# Patient Record
Sex: FEMALE | URBAN - METROPOLITAN AREA
[De-identification: names, ages, dates, MRNs, and addresses within clinical notes are randomized per-mention and may not be internally consistent; named-entity substitution may affect disease eponyms.]

---

## 2023-05-06 ENCOUNTER — HOSPITAL ENCOUNTER (OUTPATIENT)
Dept: DATA CONVERSION | Facility: HOSPITAL | Age: 78
End: 2023-05-06

## 2024-04-04 ENCOUNTER — LAB REQUISITION (OUTPATIENT)
Dept: LAB | Facility: HOSPITAL | Age: 79
End: 2024-04-04
Payer: MEDICAID

## 2024-04-04 DIAGNOSIS — R11.10 VOMITING, UNSPECIFIED: ICD-10-CM

## 2024-04-04 LAB
ALBUMIN SERPL BCP-MCNC: 3.7 G/DL (ref 3.4–5)
ALP SERPL-CCNC: 79 U/L (ref 33–136)
ALT SERPL W P-5'-P-CCNC: 5 U/L (ref 7–45)
ANION GAP SERPL CALC-SCNC: 13 MMOL/L (ref 10–20)
AST SERPL W P-5'-P-CCNC: 10 U/L (ref 9–39)
BASOPHILS # BLD AUTO: 0.03 X10*3/UL (ref 0–0.1)
BASOPHILS NFR BLD AUTO: 0.3 %
BILIRUB SERPL-MCNC: 0.5 MG/DL (ref 0–1.2)
BUN SERPL-MCNC: 33 MG/DL (ref 6–23)
CALCIUM SERPL-MCNC: 9.5 MG/DL (ref 8.6–10.3)
CHLORIDE SERPL-SCNC: 104 MMOL/L (ref 98–107)
CO2 SERPL-SCNC: 30 MMOL/L (ref 21–32)
CREAT SERPL-MCNC: 1.37 MG/DL (ref 0.5–1.05)
EGFRCR SERPLBLD CKD-EPI 2021: 39 ML/MIN/1.73M*2
EOSINOPHIL # BLD AUTO: 0.08 X10*3/UL (ref 0–0.4)
EOSINOPHIL NFR BLD AUTO: 0.9 %
ERYTHROCYTE [DISTWIDTH] IN BLOOD BY AUTOMATED COUNT: 16.2 % (ref 11.5–14.5)
GLUCOSE SERPL-MCNC: 93 MG/DL (ref 74–99)
HCT VFR BLD AUTO: 38.1 % (ref 36–46)
HGB BLD-MCNC: 11.6 G/DL (ref 12–16)
IMM GRANULOCYTES # BLD AUTO: 0.03 X10*3/UL (ref 0–0.5)
IMM GRANULOCYTES NFR BLD AUTO: 0.3 % (ref 0–0.9)
LYMPHOCYTES # BLD AUTO: 1.43 X10*3/UL (ref 0.8–3)
LYMPHOCYTES NFR BLD AUTO: 15.4 %
MCH RBC QN AUTO: 27.4 PG (ref 26–34)
MCHC RBC AUTO-ENTMCNC: 30.4 G/DL (ref 32–36)
MCV RBC AUTO: 90 FL (ref 80–100)
MONOCYTES # BLD AUTO: 0.82 X10*3/UL (ref 0.05–0.8)
MONOCYTES NFR BLD AUTO: 8.8 %
NEUTROPHILS # BLD AUTO: 6.9 X10*3/UL (ref 1.6–5.5)
NEUTROPHILS NFR BLD AUTO: 74.3 %
NRBC BLD-RTO: 0 /100 WBCS (ref 0–0)
PLATELET # BLD AUTO: 241 X10*3/UL (ref 150–450)
POTASSIUM SERPL-SCNC: 4.3 MMOL/L (ref 3.5–5.3)
PROT SERPL-MCNC: 6.4 G/DL (ref 6.4–8.2)
RBC # BLD AUTO: 4.23 X10*6/UL (ref 4–5.2)
SODIUM SERPL-SCNC: 143 MMOL/L (ref 136–145)
WBC # BLD AUTO: 9.3 X10*3/UL (ref 4.4–11.3)

## 2024-04-04 PROCEDURE — 80053 COMPREHEN METABOLIC PANEL: CPT

## 2024-04-04 PROCEDURE — 85025 COMPLETE CBC W/AUTO DIFF WBC: CPT

## 2025-06-15 ENCOUNTER — APPOINTMENT (OUTPATIENT)
Dept: CARDIOLOGY | Facility: HOSPITAL | Age: 80
End: 2025-06-15
Payer: MEDICAID

## 2025-06-15 ENCOUNTER — HOSPITAL ENCOUNTER (INPATIENT)
Facility: HOSPITAL | Age: 80
LOS: 6 days | Discharge: HOME HEALTH | End: 2025-06-21
Attending: STUDENT IN AN ORGANIZED HEALTH CARE EDUCATION/TRAINING PROGRAM | Admitting: INTERNAL MEDICINE
Payer: MEDICAID

## 2025-06-15 ENCOUNTER — APPOINTMENT (OUTPATIENT)
Dept: RADIOLOGY | Facility: HOSPITAL | Age: 80
End: 2025-06-15
Payer: MEDICAID

## 2025-06-15 DIAGNOSIS — M15.0 PRIMARY OSTEOARTHRITIS INVOLVING MULTIPLE JOINTS: ICD-10-CM

## 2025-06-15 DIAGNOSIS — L03.115 CELLULITIS OF RIGHT LOWER EXTREMITY: Primary | ICD-10-CM

## 2025-06-15 DIAGNOSIS — S80.11XA HEMATOMA OF RIGHT LOWER EXTREMITY, INITIAL ENCOUNTER: ICD-10-CM

## 2025-06-15 DIAGNOSIS — I48.92 ATRIAL FLUTTER, UNSPECIFIED TYPE (MULTI): ICD-10-CM

## 2025-06-15 DIAGNOSIS — R41.0 DELIRIUM: ICD-10-CM

## 2025-06-15 LAB
ALBUMIN SERPL BCP-MCNC: 3.3 G/DL (ref 3.4–5)
ALP SERPL-CCNC: 79 U/L (ref 33–136)
ALT SERPL W P-5'-P-CCNC: 9 U/L (ref 7–45)
ANION GAP SERPL CALCULATED.3IONS-SCNC: 13 MMOL/L (ref 10–20)
APPEARANCE UR: ABNORMAL
AST SERPL W P-5'-P-CCNC: 13 U/L (ref 9–39)
BACTERIA #/AREA URNS AUTO: ABNORMAL /HPF
BASOPHILS # BLD AUTO: 0.05 X10*3/UL (ref 0–0.1)
BASOPHILS NFR BLD AUTO: 0.4 %
BILIRUB SERPL-MCNC: 0.5 MG/DL (ref 0–1.2)
BILIRUB UR STRIP.AUTO-MCNC: NEGATIVE MG/DL
BUN SERPL-MCNC: 42 MG/DL (ref 6–23)
CALCIUM SERPL-MCNC: 9.1 MG/DL (ref 8.6–10.3)
CARDIAC TROPONIN I PNL SERPL HS: 13 NG/L (ref 0–13)
CHLORIDE SERPL-SCNC: 105 MMOL/L (ref 98–107)
CO2 SERPL-SCNC: 25 MMOL/L (ref 21–32)
COLOR UR: YELLOW
CREAT SERPL-MCNC: 0.9 MG/DL (ref 0.5–1.05)
EGFRCR SERPLBLD CKD-EPI 2021: 64 ML/MIN/1.73M*2
EOSINOPHIL # BLD AUTO: 0.11 X10*3/UL (ref 0–0.4)
EOSINOPHIL NFR BLD AUTO: 0.9 %
ERYTHROCYTE [DISTWIDTH] IN BLOOD BY AUTOMATED COUNT: 14.5 % (ref 11.5–14.5)
GLUCOSE SERPL-MCNC: 106 MG/DL (ref 74–99)
GLUCOSE UR STRIP.AUTO-MCNC: NORMAL MG/DL
HCT VFR BLD AUTO: 35.6 % (ref 36–46)
HGB BLD-MCNC: 11.2 G/DL (ref 12–16)
HOLD SPECIMEN: NORMAL
IMM GRANULOCYTES # BLD AUTO: 0.07 X10*3/UL (ref 0–0.5)
IMM GRANULOCYTES NFR BLD AUTO: 0.6 % (ref 0–0.9)
KETONES UR STRIP.AUTO-MCNC: NEGATIVE MG/DL
LACTATE SERPL-SCNC: 0.6 MMOL/L (ref 0.4–2)
LACTATE SERPL-SCNC: 0.8 MMOL/L (ref 0.4–2)
LEUKOCYTE ESTERASE UR QL STRIP.AUTO: ABNORMAL
LYMPHOCYTES # BLD AUTO: 1.13 X10*3/UL (ref 0.8–3)
LYMPHOCYTES NFR BLD AUTO: 9.6 %
MCH RBC QN AUTO: 28.4 PG (ref 26–34)
MCHC RBC AUTO-ENTMCNC: 31.5 G/DL (ref 32–36)
MCV RBC AUTO: 90 FL (ref 80–100)
MONOCYTES # BLD AUTO: 0.81 X10*3/UL (ref 0.05–0.8)
MONOCYTES NFR BLD AUTO: 6.9 %
NEUTROPHILS # BLD AUTO: 9.55 X10*3/UL (ref 1.6–5.5)
NEUTROPHILS NFR BLD AUTO: 81.6 %
NITRITE UR QL STRIP.AUTO: NEGATIVE
NRBC BLD-RTO: 0 /100 WBCS (ref 0–0)
PH UR STRIP.AUTO: 5.5 [PH]
PLATELET # BLD AUTO: 300 X10*3/UL (ref 150–450)
POTASSIUM SERPL-SCNC: 4.5 MMOL/L (ref 3.5–5.3)
PROT SERPL-MCNC: 6.7 G/DL (ref 6.4–8.2)
PROT UR STRIP.AUTO-MCNC: NEGATIVE MG/DL
RBC # BLD AUTO: 3.94 X10*6/UL (ref 4–5.2)
RBC # UR STRIP.AUTO: ABNORMAL MG/DL
RBC #/AREA URNS AUTO: ABNORMAL /HPF
SODIUM SERPL-SCNC: 138 MMOL/L (ref 136–145)
SP GR UR STRIP.AUTO: 1.02
SQUAMOUS #/AREA URNS AUTO: ABNORMAL /HPF
UROBILINOGEN UR STRIP.AUTO-MCNC: NORMAL MG/DL
WBC # BLD AUTO: 11.7 X10*3/UL (ref 4.4–11.3)
WBC #/AREA URNS AUTO: ABNORMAL /HPF

## 2025-06-15 PROCEDURE — 2500000001 HC RX 250 WO HCPCS SELF ADMINISTERED DRUGS (ALT 637 FOR MEDICARE OP): Performed by: INTERNAL MEDICINE

## 2025-06-15 PROCEDURE — 99285 EMERGENCY DEPT VISIT HI MDM: CPT | Mod: 25 | Performed by: STUDENT IN AN ORGANIZED HEALTH CARE EDUCATION/TRAINING PROGRAM

## 2025-06-15 PROCEDURE — 2500000005 HC RX 250 GENERAL PHARMACY W/O HCPCS: Performed by: INTERNAL MEDICINE

## 2025-06-15 PROCEDURE — 36415 COLL VENOUS BLD VENIPUNCTURE: CPT | Performed by: STUDENT IN AN ORGANIZED HEALTH CARE EDUCATION/TRAINING PROGRAM

## 2025-06-15 PROCEDURE — 84484 ASSAY OF TROPONIN QUANT: CPT | Performed by: STUDENT IN AN ORGANIZED HEALTH CARE EDUCATION/TRAINING PROGRAM

## 2025-06-15 PROCEDURE — 96365 THER/PROPH/DIAG IV INF INIT: CPT

## 2025-06-15 PROCEDURE — 81001 URINALYSIS AUTO W/SCOPE: CPT | Performed by: STUDENT IN AN ORGANIZED HEALTH CARE EDUCATION/TRAINING PROGRAM

## 2025-06-15 PROCEDURE — 73700 CT LOWER EXTREMITY W/O DYE: CPT | Mod: RT

## 2025-06-15 PROCEDURE — 1200000002 HC GENERAL ROOM WITH TELEMETRY DAILY

## 2025-06-15 PROCEDURE — 2500000002 HC RX 250 W HCPCS SELF ADMINISTERED DRUGS (ALT 637 FOR MEDICARE OP, ALT 636 FOR OP/ED): Performed by: INTERNAL MEDICINE

## 2025-06-15 PROCEDURE — 80053 COMPREHEN METABOLIC PANEL: CPT | Performed by: STUDENT IN AN ORGANIZED HEALTH CARE EDUCATION/TRAINING PROGRAM

## 2025-06-15 PROCEDURE — 87086 URINE CULTURE/COLONY COUNT: CPT | Mod: WESLAB | Performed by: STUDENT IN AN ORGANIZED HEALTH CARE EDUCATION/TRAINING PROGRAM

## 2025-06-15 PROCEDURE — 2500000004 HC RX 250 GENERAL PHARMACY W/ HCPCS (ALT 636 FOR OP/ED): Performed by: INTERNAL MEDICINE

## 2025-06-15 PROCEDURE — 2500000004 HC RX 250 GENERAL PHARMACY W/ HCPCS (ALT 636 FOR OP/ED): Performed by: PHARMACY

## 2025-06-15 PROCEDURE — 83605 ASSAY OF LACTIC ACID: CPT | Performed by: STUDENT IN AN ORGANIZED HEALTH CARE EDUCATION/TRAINING PROGRAM

## 2025-06-15 PROCEDURE — 96361 HYDRATE IV INFUSION ADD-ON: CPT

## 2025-06-15 PROCEDURE — 96366 THER/PROPH/DIAG IV INF ADDON: CPT

## 2025-06-15 PROCEDURE — 2500000004 HC RX 250 GENERAL PHARMACY W/ HCPCS (ALT 636 FOR OP/ED): Performed by: STUDENT IN AN ORGANIZED HEALTH CARE EDUCATION/TRAINING PROGRAM

## 2025-06-15 PROCEDURE — 93010 ELECTROCARDIOGRAM REPORT: CPT | Performed by: INTERNAL MEDICINE

## 2025-06-15 PROCEDURE — 87075 CULTR BACTERIA EXCEPT BLOOD: CPT | Mod: WESLAB | Performed by: STUDENT IN AN ORGANIZED HEALTH CARE EDUCATION/TRAINING PROGRAM

## 2025-06-15 PROCEDURE — 73700 CT LOWER EXTREMITY W/O DYE: CPT | Mod: RIGHT SIDE | Performed by: RADIOLOGY

## 2025-06-15 PROCEDURE — 85025 COMPLETE CBC W/AUTO DIFF WBC: CPT | Performed by: STUDENT IN AN ORGANIZED HEALTH CARE EDUCATION/TRAINING PROGRAM

## 2025-06-15 PROCEDURE — 93005 ELECTROCARDIOGRAM TRACING: CPT

## 2025-06-15 RX ORDER — OXYBUTYNIN CHLORIDE 5 MG/1
2.5 TABLET ORAL 2 TIMES DAILY
Status: DISCONTINUED | OUTPATIENT
Start: 2025-06-15 | End: 2025-06-21 | Stop reason: HOSPADM

## 2025-06-15 RX ORDER — VANCOMYCIN 750 MG/150ML
750 INJECTION, SOLUTION INTRAVENOUS EVERY 12 HOURS
Status: DISCONTINUED | OUTPATIENT
Start: 2025-06-15 | End: 2025-06-16

## 2025-06-15 RX ORDER — SENNOSIDES 8.6 MG/1
1 TABLET ORAL 2 TIMES DAILY
COMMUNITY

## 2025-06-15 RX ORDER — GUAIFENESIN 600 MG/1
600 TABLET, EXTENDED RELEASE ORAL EVERY 12 HOURS PRN
Status: DISCONTINUED | OUTPATIENT
Start: 2025-06-15 | End: 2025-06-21 | Stop reason: HOSPADM

## 2025-06-15 RX ORDER — GUAIFENESIN/DEXTROMETHORPHAN 100-10MG/5
5 SYRUP ORAL EVERY 4 HOURS PRN
Status: DISCONTINUED | OUTPATIENT
Start: 2025-06-15 | End: 2025-06-21 | Stop reason: HOSPADM

## 2025-06-15 RX ORDER — PANTOPRAZOLE SODIUM 40 MG/1
40 TABLET, DELAYED RELEASE ORAL
Status: DISCONTINUED | OUTPATIENT
Start: 2025-06-16 | End: 2025-06-21 | Stop reason: HOSPADM

## 2025-06-15 RX ORDER — LIDOCAINE 560 MG/1
1 PATCH PERCUTANEOUS; TOPICAL; TRANSDERMAL DAILY
Status: DISCONTINUED | OUTPATIENT
Start: 2025-06-16 | End: 2025-06-21 | Stop reason: HOSPADM

## 2025-06-15 RX ORDER — POLYETHYLENE GLYCOL 3350 17 G/17G
17 POWDER, FOR SOLUTION ORAL DAILY
Status: DISCONTINUED | OUTPATIENT
Start: 2025-06-16 | End: 2025-06-17

## 2025-06-15 RX ORDER — DONEPEZIL HYDROCHLORIDE 5 MG/1
5 TABLET, FILM COATED ORAL NIGHTLY
COMMUNITY

## 2025-06-15 RX ORDER — OXYBUTYNIN CHLORIDE 5 MG/1
5 TABLET, EXTENDED RELEASE ORAL DAILY
COMMUNITY

## 2025-06-15 RX ORDER — LEVOTHYROXINE SODIUM 150 UG/1
150 TABLET ORAL DAILY
COMMUNITY

## 2025-06-15 RX ORDER — LEVOTHYROXINE SODIUM 150 UG/1
150 TABLET ORAL DAILY
Status: DISCONTINUED | OUTPATIENT
Start: 2025-06-16 | End: 2025-06-21 | Stop reason: HOSPADM

## 2025-06-15 RX ORDER — FERROUS SULFATE 325(65) MG
1 TABLET ORAL
Status: DISCONTINUED | OUTPATIENT
Start: 2025-06-16 | End: 2025-06-21 | Stop reason: HOSPADM

## 2025-06-15 RX ORDER — ACETAMINOPHEN 500 MG
1000 TABLET ORAL 3 TIMES DAILY
COMMUNITY

## 2025-06-15 RX ORDER — ACETAMINOPHEN 650 MG/1
650 SUPPOSITORY RECTAL EVERY 4 HOURS PRN
Status: DISCONTINUED | OUTPATIENT
Start: 2025-06-15 | End: 2025-06-21 | Stop reason: HOSPADM

## 2025-06-15 RX ORDER — POLYETHYLENE GLYCOL 3350 17 G/17G
17 POWDER, FOR SOLUTION ORAL DAILY
COMMUNITY

## 2025-06-15 RX ORDER — FERROUS SULFATE 325(65) MG
325 TABLET ORAL
COMMUNITY

## 2025-06-15 RX ORDER — HYDROCODONE BITARTRATE AND ACETAMINOPHEN 5; 325 MG/1; MG/1
1 TABLET ORAL EVERY 6 HOURS PRN
Refills: 0 | Status: DISCONTINUED | OUTPATIENT
Start: 2025-06-15 | End: 2025-06-21 | Stop reason: HOSPADM

## 2025-06-15 RX ORDER — ONDANSETRON 4 MG/1
4 TABLET, FILM COATED ORAL EVERY 8 HOURS PRN
Status: DISCONTINUED | OUTPATIENT
Start: 2025-06-15 | End: 2025-06-21 | Stop reason: HOSPADM

## 2025-06-15 RX ORDER — HYDROXYZINE PAMOATE 25 MG/1
25 CAPSULE ORAL EVERY 6 HOURS PRN
COMMUNITY

## 2025-06-15 RX ORDER — LIDOCAINE 50 MG/G
1 PATCH TOPICAL NIGHTLY
COMMUNITY

## 2025-06-15 RX ORDER — ACETAMINOPHEN 325 MG/1
650 TABLET ORAL EVERY 4 HOURS PRN
Status: DISCONTINUED | OUTPATIENT
Start: 2025-06-15 | End: 2025-06-21 | Stop reason: HOSPADM

## 2025-06-15 RX ORDER — VANCOMYCIN HYDROCHLORIDE 1 G/20ML
INJECTION, POWDER, LYOPHILIZED, FOR SOLUTION INTRAVENOUS DAILY PRN
Status: DISCONTINUED | OUTPATIENT
Start: 2025-06-15 | End: 2025-06-20

## 2025-06-15 RX ORDER — ONDANSETRON HYDROCHLORIDE 2 MG/ML
4 INJECTION, SOLUTION INTRAVENOUS EVERY 8 HOURS PRN
Status: DISCONTINUED | OUTPATIENT
Start: 2025-06-15 | End: 2025-06-21 | Stop reason: HOSPADM

## 2025-06-15 RX ORDER — ACETAMINOPHEN 160 MG/5ML
650 SOLUTION ORAL EVERY 4 HOURS PRN
Status: DISCONTINUED | OUTPATIENT
Start: 2025-06-15 | End: 2025-06-21 | Stop reason: HOSPADM

## 2025-06-15 RX ORDER — DONEPEZIL HYDROCHLORIDE 5 MG/1
5 TABLET, FILM COATED ORAL NIGHTLY
Status: DISCONTINUED | OUTPATIENT
Start: 2025-06-15 | End: 2025-06-21 | Stop reason: HOSPADM

## 2025-06-15 RX ORDER — POLYETHYLENE GLYCOL 3350 17 G/17G
17 POWDER, FOR SOLUTION ORAL DAILY PRN
Status: DISCONTINUED | OUTPATIENT
Start: 2025-06-15 | End: 2025-06-21 | Stop reason: HOSPADM

## 2025-06-15 RX ORDER — TALC
6 POWDER (GRAM) TOPICAL NIGHTLY PRN
Status: DISCONTINUED | OUTPATIENT
Start: 2025-06-15 | End: 2025-06-21 | Stop reason: HOSPADM

## 2025-06-15 RX ORDER — HEPARIN SODIUM 5000 [USP'U]/ML
5000 INJECTION, SOLUTION INTRAVENOUS; SUBCUTANEOUS EVERY 8 HOURS
Status: DISCONTINUED | OUTPATIENT
Start: 2025-06-15 | End: 2025-06-21 | Stop reason: HOSPADM

## 2025-06-15 RX ORDER — OMEPRAZOLE 20 MG/1
20 TABLET, DELAYED RELEASE ORAL
COMMUNITY

## 2025-06-15 RX ORDER — VANCOMYCIN 2 G/400ML
2 INJECTION, SOLUTION INTRAVENOUS ONCE
Status: COMPLETED | OUTPATIENT
Start: 2025-06-15 | End: 2025-06-15

## 2025-06-15 RX ORDER — HYDROXYZINE HYDROCHLORIDE 25 MG/1
25 TABLET, FILM COATED ORAL EVERY 8 HOURS PRN
Status: DISCONTINUED | OUTPATIENT
Start: 2025-06-15 | End: 2025-06-21 | Stop reason: HOSPADM

## 2025-06-15 RX ADMIN — SODIUM CHLORIDE 1710 ML: 900 INJECTION, SOLUTION INTRAVENOUS at 07:43

## 2025-06-15 RX ADMIN — HEPARIN SODIUM 5000 UNITS: 5000 INJECTION, SOLUTION INTRAVENOUS; SUBCUTANEOUS at 18:32

## 2025-06-15 RX ADMIN — VANCOMYCIN 2 G: 2 INJECTION, SOLUTION INTRAVENOUS at 08:20

## 2025-06-15 RX ADMIN — PIPERACILLIN SODIUM AND TAZOBACTAM SODIUM 4.5 G: 4; .5 INJECTION, SOLUTION INTRAVENOUS at 07:43

## 2025-06-15 RX ADMIN — OXYBUTYNIN CHLORIDE 2.5 MG: 5 TABLET ORAL at 22:58

## 2025-06-15 RX ADMIN — ACETAMINOPHEN 650 MG: 325 TABLET ORAL at 22:59

## 2025-06-15 RX ADMIN — PIPERACILLIN SODIUM AND TAZOBACTAM SODIUM 4.5 G: 4; .5 INJECTION, SOLUTION INTRAVENOUS at 23:28

## 2025-06-15 RX ADMIN — DONEPEZIL HYDROCHLORIDE 5 MG: 5 TABLET, FILM COATED ORAL at 22:59

## 2025-06-15 RX ADMIN — MELATONIN 6 MG: 3 TAB ORAL at 22:59

## 2025-06-15 RX ADMIN — VANCOMYCIN 750 MG: 750 INJECTION, SOLUTION INTRAVENOUS at 21:42

## 2025-06-15 RX ADMIN — PIPERACILLIN SODIUM AND TAZOBACTAM SODIUM 4.5 G: 4; .5 INJECTION, SOLUTION INTRAVENOUS at 18:32

## 2025-06-15 SDOH — SOCIAL STABILITY: SOCIAL INSECURITY
WITHIN THE LAST YEAR, HAVE YOU BEEN KICKED, HIT, SLAPPED, OR OTHERWISE PHYSICALLY HURT BY YOUR PARTNER OR EX-PARTNER?: NO

## 2025-06-15 SDOH — ECONOMIC STABILITY: FOOD INSECURITY: WITHIN THE PAST 12 MONTHS, THE FOOD YOU BOUGHT JUST DIDN'T LAST AND YOU DIDN'T HAVE MONEY TO GET MORE.: NEVER TRUE

## 2025-06-15 SDOH — SOCIAL STABILITY: SOCIAL INSECURITY: DO YOU FEEL ANYONE HAS EXPLOITED OR TAKEN ADVANTAGE OF YOU FINANCIALLY OR OF YOUR PERSONAL PROPERTY?: NO

## 2025-06-15 SDOH — SOCIAL STABILITY: SOCIAL INSECURITY: ARE THERE ANY APPARENT SIGNS OF INJURIES/BEHAVIORS THAT COULD BE RELATED TO ABUSE/NEGLECT?: NO

## 2025-06-15 SDOH — ECONOMIC STABILITY: INCOME INSECURITY: IN THE PAST 12 MONTHS HAS THE ELECTRIC, GAS, OIL, OR WATER COMPANY THREATENED TO SHUT OFF SERVICES IN YOUR HOME?: NO

## 2025-06-15 SDOH — ECONOMIC STABILITY: TRANSPORTATION INSECURITY: IN THE PAST 12 MONTHS, HAS LACK OF TRANSPORTATION KEPT YOU FROM MEDICAL APPOINTMENTS OR FROM GETTING MEDICATIONS?: NO

## 2025-06-15 SDOH — ECONOMIC STABILITY: HOUSING INSECURITY: IN THE LAST 12 MONTHS, WAS THERE A TIME WHEN YOU WERE NOT ABLE TO PAY THE MORTGAGE OR RENT ON TIME?: NO

## 2025-06-15 SDOH — SOCIAL STABILITY: SOCIAL INSECURITY
WITHIN THE LAST YEAR, HAVE YOU BEEN RAPED OR FORCED TO HAVE ANY KIND OF SEXUAL ACTIVITY BY YOUR PARTNER OR EX-PARTNER?: NO

## 2025-06-15 SDOH — SOCIAL STABILITY: SOCIAL INSECURITY: ABUSE: ADULT

## 2025-06-15 SDOH — ECONOMIC STABILITY: FOOD INSECURITY: WITHIN THE PAST 12 MONTHS, YOU WORRIED THAT YOUR FOOD WOULD RUN OUT BEFORE YOU GOT THE MONEY TO BUY MORE.: NEVER TRUE

## 2025-06-15 SDOH — SOCIAL STABILITY: SOCIAL INSECURITY: WITHIN THE LAST YEAR, HAVE YOU BEEN AFRAID OF YOUR PARTNER OR EX-PARTNER?: NO

## 2025-06-15 SDOH — SOCIAL STABILITY: SOCIAL INSECURITY: HAVE YOU HAD THOUGHTS OF HARMING ANYONE ELSE?: NO

## 2025-06-15 SDOH — SOCIAL STABILITY: SOCIAL INSECURITY: WERE YOU ABLE TO COMPLETE ALL THE BEHAVIORAL HEALTH SCREENINGS?: YES

## 2025-06-15 SDOH — SOCIAL STABILITY: SOCIAL INSECURITY: WITHIN THE LAST YEAR, HAVE YOU BEEN HUMILIATED OR EMOTIONALLY ABUSED IN OTHER WAYS BY YOUR PARTNER OR EX-PARTNER?: NO

## 2025-06-15 SDOH — ECONOMIC STABILITY: FOOD INSECURITY: HOW HARD IS IT FOR YOU TO PAY FOR THE VERY BASICS LIKE FOOD, HOUSING, MEDICAL CARE, AND HEATING?: NOT HARD AT ALL

## 2025-06-15 SDOH — ECONOMIC STABILITY: HOUSING INSECURITY: AT ANY TIME IN THE PAST 12 MONTHS, WERE YOU HOMELESS OR LIVING IN A SHELTER (INCLUDING NOW)?: NO

## 2025-06-15 SDOH — SOCIAL STABILITY: SOCIAL INSECURITY: HAVE YOU HAD ANY THOUGHTS OF HARMING ANYONE ELSE?: NO

## 2025-06-15 SDOH — ECONOMIC STABILITY: HOUSING INSECURITY: IN THE PAST 12 MONTHS, HOW MANY TIMES HAVE YOU MOVED WHERE YOU WERE LIVING?: 0

## 2025-06-15 SDOH — SOCIAL STABILITY: SOCIAL INSECURITY: DOES ANYONE TRY TO KEEP YOU FROM HAVING/CONTACTING OTHER FRIENDS OR DOING THINGS OUTSIDE YOUR HOME?: NO

## 2025-06-15 SDOH — SOCIAL STABILITY: SOCIAL INSECURITY: DO YOU FEEL UNSAFE GOING BACK TO THE PLACE WHERE YOU ARE LIVING?: NO

## 2025-06-15 SDOH — SOCIAL STABILITY: SOCIAL INSECURITY: HAS ANYONE EVER THREATENED TO HURT YOUR FAMILY OR YOUR PETS?: NO

## 2025-06-15 SDOH — SOCIAL STABILITY: SOCIAL INSECURITY: ARE YOU OR HAVE YOU BEEN THREATENED OR ABUSED PHYSICALLY, EMOTIONALLY, OR SEXUALLY BY ANYONE?: NO

## 2025-06-15 ASSESSMENT — COGNITIVE AND FUNCTIONAL STATUS - GENERAL
WALKING IN HOSPITAL ROOM: TOTAL
EATING MEALS: A LITTLE
PATIENT BASELINE BEDBOUND: NO
MOBILITY SCORE: 14
PERSONAL GROOMING: A LOT
HELP NEEDED FOR BATHING: A LOT
MOVING FROM LYING ON BACK TO SITTING ON SIDE OF FLAT BED WITH BEDRAILS: A LITTLE
STANDING UP FROM CHAIR USING ARMS: A LOT
DAILY ACTIVITIY SCORE: 13
MOVING TO AND FROM BED TO CHAIR: A LOT
WALKING IN HOSPITAL ROOM: A LOT
EATING MEALS: A LITTLE
CLIMB 3 TO 5 STEPS WITH RAILING: A LITTLE
CLIMB 3 TO 5 STEPS WITH RAILING: TOTAL
DRESSING REGULAR UPPER BODY CLOTHING: A LITTLE
TURNING FROM BACK TO SIDE WHILE IN FLAT BAD: A LOT
MOBILITY SCORE: 11
HELP NEEDED FOR BATHING: A LOT
DRESSING REGULAR UPPER BODY CLOTHING: A LOT
TOILETING: A LOT
DAILY ACTIVITIY SCORE: 15
PERSONAL GROOMING: A LITTLE
TOILETING: A LOT
DRESSING REGULAR LOWER BODY CLOTHING: A LOT
STANDING UP FROM CHAIR USING ARMS: A LOT
DRESSING REGULAR LOWER BODY CLOTHING: A LOT
MOVING TO AND FROM BED TO CHAIR: A LOT
TURNING FROM BACK TO SIDE WHILE IN FLAT BAD: A LOT
MOVING FROM LYING ON BACK TO SITTING ON SIDE OF FLAT BED WITH BEDRAILS: A LITTLE

## 2025-06-15 ASSESSMENT — LIFESTYLE VARIABLES
TOTAL SCORE: 0
HOW OFTEN DO YOU HAVE A DRINK CONTAINING ALCOHOL: NEVER
EVER FELT BAD OR GUILTY ABOUT YOUR DRINKING: NO
AUDIT-C TOTAL SCORE: 0
EVER HAD A DRINK FIRST THING IN THE MORNING TO STEADY YOUR NERVES TO GET RID OF A HANGOVER: NO
HAVE YOU EVER FELT YOU SHOULD CUT DOWN ON YOUR DRINKING: NO
HAVE PEOPLE ANNOYED YOU BY CRITICIZING YOUR DRINKING: NO
SKIP TO QUESTIONS 9-10: 1
HOW MANY STANDARD DRINKS CONTAINING ALCOHOL DO YOU HAVE ON A TYPICAL DAY: PATIENT DOES NOT DRINK
HOW OFTEN DO YOU HAVE 6 OR MORE DRINKS ON ONE OCCASION: NEVER
AUDIT-C TOTAL SCORE: 0

## 2025-06-15 ASSESSMENT — ACTIVITIES OF DAILY LIVING (ADL)
LACK_OF_TRANSPORTATION: NO
ASSISTIVE_DEVICE: WALKER
JUDGMENT_ADEQUATE_SAFELY_COMPLETE_DAILY_ACTIVITIES: YES
PATIENT'S MEMORY ADEQUATE TO SAFELY COMPLETE DAILY ACTIVITIES?: YES
TOILETING: NEEDS ASSISTANCE
FEEDING YOURSELF: INDEPENDENT
HEARING - RIGHT EAR: FUNCTIONAL
GROOMING: NEEDS ASSISTANCE
DRESSING YOURSELF: NEEDS ASSISTANCE
LACK_OF_TRANSPORTATION: NO
HEARING - LEFT EAR: FUNCTIONAL
ADEQUATE_TO_COMPLETE_ADL: YES
WALKS IN HOME: NEEDS ASSISTANCE
BATHING: NEEDS ASSISTANCE

## 2025-06-15 ASSESSMENT — PAIN SCALES - PAIN ASSESSMENT IN ADVANCED DEMENTIA (PAINAD)
BREATHING: NORMAL
TOTALSCORE: 0
CONSOLABILITY: NO NEED TO CONSOLE
FACIALEXPRESSION: SMILING OR INEXPRESSIVE
BODYLANGUAGE: RELAXED

## 2025-06-15 ASSESSMENT — PAIN DESCRIPTION - LOCATION: LOCATION: LEG

## 2025-06-15 ASSESSMENT — PAIN SCALES - GENERAL
PAINLEVEL_OUTOF10: 0 - NO PAIN
PAINLEVEL_OUTOF10: 0 - NO PAIN
PAINLEVEL_OUTOF10: 4
PAINLEVEL_OUTOF10: 0 - NO PAIN

## 2025-06-15 ASSESSMENT — PAIN DESCRIPTION - ORIENTATION: ORIENTATION: RIGHT;LEFT

## 2025-06-15 ASSESSMENT — PAIN DESCRIPTION - DESCRIPTORS: DESCRIPTORS: ACHING;BURNING

## 2025-06-15 ASSESSMENT — PAIN - FUNCTIONAL ASSESSMENT
PAIN_FUNCTIONAL_ASSESSMENT: 0-10

## 2025-06-15 ASSESSMENT — PATIENT HEALTH QUESTIONNAIRE - PHQ9
1. LITTLE INTEREST OR PLEASURE IN DOING THINGS: NOT AT ALL
2. FEELING DOWN, DEPRESSED OR HOPELESS: NOT AT ALL
SUM OF ALL RESPONSES TO PHQ9 QUESTIONS 1 & 2: 0

## 2025-06-15 ASSESSMENT — PAIN SCALES - WONG BAKER: WONGBAKER_NUMERICALRESPONSE: NO HURT

## 2025-06-15 NOTE — ED PROVIDER NOTES
Emergency Department Provider Note       History of Present Illness     History provided by: Family member, EMS  Limitations to History: None  External Records Reviewed with Brief Summary: Nursing home documentation, outpatient notes    HPI:  Pati Frederick is a 79 y.o. female who presents with possible chest pain versus altered mental status.  Patient has a history of dementia and told her staff members at her nursing facility that she had chest pain.  With EMS she states it was due to having a cell phone fall into her chest.  With me she states it was related to a breathing treatment that was administered overnight.  Neither of these events occurred, and is likely related to her dementia.  Patient currently is denying any chest pain.  Patient denies any shortness of breath.  Family ember at bedside states the patient struck her right lower extremity against something and developed swelling approximately 1 week ago.  Family was concerned that the swelling appears worse and more red.  Patient is tachycardic on arrival.  Family member is concerned that the patient appears to be more confused than her baseline.    Physical Exam   Triage vitals:  T 36.7 °C (98 °F)  HR (!) 121  /65  RR 18  O2 94 %      Physical Exam  Vitals and nursing note reviewed.   Constitutional:       General: She is not in acute distress.     Appearance: She is not ill-appearing.   HENT:      Head: Normocephalic and atraumatic.      Mouth/Throat:      Mouth: Mucous membranes are moist.      Pharynx: Oropharynx is clear.   Eyes:      Extraocular Movements: Extraocular movements intact.      Conjunctiva/sclera: Conjunctivae normal.      Pupils: Pupils are equal, round, and reactive to light.   Cardiovascular:      Rate and Rhythm: Regular rhythm. Tachycardia present.   Pulmonary:      Effort: Pulmonary effort is normal. No respiratory distress.      Breath sounds: Normal breath sounds.   Abdominal:      Palpations: Abdomen is soft.       Tenderness: There is no abdominal tenderness.   Musculoskeletal:         General: No deformity. Normal range of motion.      Cervical back: Normal range of motion and neck supple.      Comments: Large fluid collection noted to the lateral edge of the right lower extremity with surrounding erythema and centralized eschar.  No tenderness on palpation.   Skin:     General: Skin is warm and dry.      Capillary Refill: Capillary refill takes less than 2 seconds.   Neurological:      General: No focal deficit present.      Mental Status: She is alert. She is confused.   Psychiatric:         Mood and Affect: Mood normal.         Behavior: Behavior normal.           Medical Decision Making & ED Course   Medical Decision Makin y.o. female presents with AMS.  I have considered the following conditions in my assessment of this patient's altered mental status: Hypo/Hyperglycemia, CVA, MI, hepatic encephalopathy, encephalitis, HHNC, hypernatremia, hyponatremia, hypo/hyperthyroidism, seizure, Encephalopathy, Infection, Psychosis, overdose, alcohol intoxication, trauma, delirium.  Patient meeting SIRS criteria given the tachycardia and softer blood pressures, sepsis bundle initiated.  Patient covered for suspected cellulitis.  Urinalysis did return with concerns for possible infection, patient already covered with Zosyn, will send for culture.  Lactate was negative.  Given possible chest pain, troponin ordered, but negative.  Mild leukocytosis to 11.7.  CT tib-fib obtained but unfortunately had to be performed without contrast due to contrast allergy.  Hounsfield units most consistent with hematoma.  Needle aspiration performed at bedside, bloody material aspirated which is also consistent with hematoma.  Compression wrap placed but patient will require wound care evaluation due to centralized eschar.  Wound consult initiated.  Patient admitted to primary provider given persistent tachycardia for further IV antibiotic  treatment.    SEP-1 CORE MEASURE DATA    6/15/2025  7:24 AM  Visit Vitals  /62   Pulse (!) 117   Temp 36.7 °C (98 °F) (Oral)   Resp 18      WBC   Date/Time Value Ref Range Status   06/15/2025 07:34 AM 11.7 (H) 4.4 - 11.3 x10*3/uL Final     Lactate   Date/Time Value Ref Range Status   06/15/2025 10:19 AM 0.6 0.4 - 2.0 mmol/L Final     Creatinine   Date/Time Value Ref Range Status   06/15/2025 07:34 AM 0.90 0.50 - 1.05 mg/dL Final     Bilirubin, Total   Date/Time Value Ref Range Status   06/15/2025 07:34 AM 0.5 0.0 - 1.2 mg/dL Final     Platelets   Date/Time Value Ref Range Status   06/15/2025 07:34  150 - 450 x10*3/uL Final        Fluid Resuscitation Rationale: at least 30mL/kg based on ideal body weight due to obesity defined as BMI>30 (patient's BMI is Body mass index is 36.69 kg/m².  and IBW is Ideal body weight: 57 kg (125 lb 10.6 oz)  Adjusted ideal body weight: 74.2 kg (163 lb 9.3 oz) )    I performed a sepsis reperfusion exam on Pati Frederick on 06/15/25 at 0900    ----      Social Determinants of Health which Significantly Impact Care: Social Determinants of Health which Significantly Impact Care: None identified     EKG Independent Interpretation: EKG interpreted by myself. Please see ED Course for full interpretation.    Independent Result Review and Interpretation: Relevant laboratory and radiographic results were reviewed and independently interpreted by myself.  As necessary, they are commented on in the ED Course.    Chronic conditions affecting the patient's care: As documented above in [facility]    The patient was discussed with the following consultants/services: Hospitalist/Admitting Provider who accepted the patient for admission    Care Considerations: As documented above in [facility]    ED Course:  ED Course as of 06/15/25 1133   Sun Bob 15, 2025   0723 ECG 12 lead  Performed at  0720, HR of 122, NSR, NAD, QTc 436, no sign of STEMI, no Q wave or T wave abnormality noted.    Reviewed and  interpreted by me at time performed   [JM]      ED Course User Index  [JM] Bettye Danielle MD         Diagnoses as of 06/15/25 1133   Cellulitis of right lower extremity   Hematoma of right lower extremity, initial encounter   Delirium       Disposition   As a result of their workup, the patient will require admission to the hospital.  The patient was informed of her diagnosis.  The patient was given the opportunity to ask questions and I answered them. The patient agreed to be admitted to the hospital.    Procedures   Procedures    Bettye Danielle MD  Emergency Medicine       Bettye Danielle MD  06/15/25 1134

## 2025-06-15 NOTE — NURSING NOTE
"Pt started crying and getting onfusedm  looking for \"2 girls\" . I called pt daughter Leonela to speak with the patient.  "

## 2025-06-15 NOTE — CONSULTS
Vancomycin Dosing by Pharmacy- INITIAL    Pati Frederick is a 79 y.o. year old female who Pharmacy has been consulted for vancomycin dosing for cellulitis, skin and soft tissue. Based on the patient's indication and renal status this patient will be dosed based on a goal AUC of 400-600.     Renal function is currently stable.    Visit Vitals  /62   Pulse (!) 117   Temp 36.7 °C (98 °F) (Oral)   Resp 18        Lab Results   Component Value Date    CREATININE 0.90 06/15/2025    CREATININE 1.11 (H) 2025    CREATININE 1.37 (H) 2024        Patient weight is as follows:   Vitals:    06/15/25 0714   Weight: 100 kg (220 lb 7.4 oz)       Cultures:  No results found for the encounter in last 14 days.        No intake/output data recorded.  I/O during current shift:  No intake/output data recorded.    Temp (24hrs), Av.7 °C (98 °F), Min:36.7 °C (98 °F), Max:36.7 °C (98 °F)         Assessment/Plan     Patient has already been given a loading dose of 2000 mg on 06/15/25 at 0820.  Will initiate vancomycin maintenance, 750 mg every 12 hours beginning 06/15/25 at 2200.    This dosing regimen is predicted by [employer] to result in the following pharmacokinetic parameters:    Loading dose: N/A  Regimen: 750 mg IV every 12 hours.  Start time: 22:00 on 06/15/2025  Exposure target: AUC24 (range) 400-600 mg/L.hr   QCY63-06: 426 mg/L.hr  AUC24,ss: 464 mg/L.hr  Probability of AUC24 > 400: 66 %  Ctrough,ss: 16 mg/L  Probability of Ctrough,ss > 20: 28 %    Follow-up level will be ordered on 25 at AM lab draw unless clinically indicated sooner.  Will continue to monitor renal function daily while on vancomycin and order serum creatinine at least every 48 hours if not already ordered.  Follow for continued vancomycin needs, clinical response, and signs/symptoms of toxicity.       Bertram Thomason, BelloD

## 2025-06-15 NOTE — ED TRIAGE NOTES
From post acute care. Told the nurse for an hour that she had chest pain. Denied to EMS. Having trouble breathing on inspiration. She is confused at baseline because she has dementia. Per chart had cellulitis 6/9 and started on antibiotics that are to end on 6/16/2025

## 2025-06-15 NOTE — CARE PLAN
Problem: Pain - Adult  Goal: Verbalizes/displays adequate comfort level or baseline comfort level  Outcome: Progressing   The patient's goals for the shift include Iv antibiotics    The clinical goals for the shift include no falls, IV antibiotics

## 2025-06-16 PROBLEM — S80.11XA HEMATOMA OF RIGHT LOWER EXTREMITY: Status: ACTIVE | Noted: 2025-06-15

## 2025-06-16 LAB
ALBUMIN SERPL BCP-MCNC: 2.9 G/DL (ref 3.4–5)
ALP SERPL-CCNC: 64 U/L (ref 33–136)
ALT SERPL W P-5'-P-CCNC: 9 U/L (ref 7–45)
ANION GAP SERPL CALCULATED.3IONS-SCNC: 10 MMOL/L (ref 10–20)
AST SERPL W P-5'-P-CCNC: 12 U/L (ref 9–39)
BACTERIA UR CULT: NORMAL
BILIRUB SERPL-MCNC: 0.5 MG/DL (ref 0–1.2)
BUN SERPL-MCNC: 32 MG/DL (ref 6–23)
CALCIUM SERPL-MCNC: 8.7 MG/DL (ref 8.6–10.3)
CHLORIDE SERPL-SCNC: 109 MMOL/L (ref 98–107)
CO2 SERPL-SCNC: 25 MMOL/L (ref 21–32)
CREAT SERPL-MCNC: 0.87 MG/DL (ref 0.5–1.05)
EGFRCR SERPLBLD CKD-EPI 2021: 67 ML/MIN/1.73M*2
ERYTHROCYTE [DISTWIDTH] IN BLOOD BY AUTOMATED COUNT: 14.6 % (ref 11.5–14.5)
GLUCOSE SERPL-MCNC: 91 MG/DL (ref 74–99)
HCT VFR BLD AUTO: 31.9 % (ref 36–46)
HGB BLD-MCNC: 10.3 G/DL (ref 12–16)
MCH RBC QN AUTO: 28.3 PG (ref 26–34)
MCHC RBC AUTO-ENTMCNC: 32.3 G/DL (ref 32–36)
MCV RBC AUTO: 88 FL (ref 80–100)
NRBC BLD-RTO: 0 /100 WBCS (ref 0–0)
PLATELET # BLD AUTO: 260 X10*3/UL (ref 150–450)
POTASSIUM SERPL-SCNC: 4.6 MMOL/L (ref 3.5–5.3)
PROT SERPL-MCNC: 5.9 G/DL (ref 6.4–8.2)
RBC # BLD AUTO: 3.64 X10*6/UL (ref 4–5.2)
SODIUM SERPL-SCNC: 139 MMOL/L (ref 136–145)
VANCOMYCIN SERPL-MCNC: 22.2 UG/ML (ref 5–20)
WBC # BLD AUTO: 9 X10*3/UL (ref 4.4–11.3)

## 2025-06-16 PROCEDURE — 1200000002 HC GENERAL ROOM WITH TELEMETRY DAILY

## 2025-06-16 PROCEDURE — 99252 IP/OBS CONSLTJ NEW/EST SF 35: CPT | Performed by: PHYSICIAN ASSISTANT

## 2025-06-16 PROCEDURE — 97161 PT EVAL LOW COMPLEX 20 MIN: CPT | Mod: GP | Performed by: PHYSICAL THERAPIST

## 2025-06-16 PROCEDURE — 80202 ASSAY OF VANCOMYCIN: CPT | Performed by: PHARMACY

## 2025-06-16 PROCEDURE — 2500000004 HC RX 250 GENERAL PHARMACY W/ HCPCS (ALT 636 FOR OP/ED): Performed by: INTERNAL MEDICINE

## 2025-06-16 PROCEDURE — 2500000002 HC RX 250 W HCPCS SELF ADMINISTERED DRUGS (ALT 637 FOR MEDICARE OP, ALT 636 FOR OP/ED): Performed by: INTERNAL MEDICINE

## 2025-06-16 PROCEDURE — 2500000005 HC RX 250 GENERAL PHARMACY W/O HCPCS: Performed by: INTERNAL MEDICINE

## 2025-06-16 PROCEDURE — 80053 COMPREHEN METABOLIC PANEL: CPT | Performed by: INTERNAL MEDICINE

## 2025-06-16 PROCEDURE — 85027 COMPLETE CBC AUTOMATED: CPT | Performed by: INTERNAL MEDICINE

## 2025-06-16 PROCEDURE — 36415 COLL VENOUS BLD VENIPUNCTURE: CPT | Performed by: INTERNAL MEDICINE

## 2025-06-16 PROCEDURE — 2500000001 HC RX 250 WO HCPCS SELF ADMINISTERED DRUGS (ALT 637 FOR MEDICARE OP): Performed by: INTERNAL MEDICINE

## 2025-06-16 RX ORDER — VANCOMYCIN 1.75 G/350ML
1250 INJECTION, SOLUTION INTRAVENOUS EVERY 24 HOURS
Status: DISCONTINUED | OUTPATIENT
Start: 2025-06-16 | End: 2025-06-18

## 2025-06-16 RX ADMIN — FERROUS SULFATE TAB 325 MG (65 MG ELEMENTAL FE) 1 TABLET: 325 (65 FE) TAB at 08:24

## 2025-06-16 RX ADMIN — HEPARIN SODIUM 5000 UNITS: 5000 INJECTION, SOLUTION INTRAVENOUS; SUBCUTANEOUS at 10:06

## 2025-06-16 RX ADMIN — LIDOCAINE 4% 1 PATCH: 40 PATCH TOPICAL at 08:24

## 2025-06-16 RX ADMIN — OXYBUTYNIN CHLORIDE 2.5 MG: 5 TABLET ORAL at 20:12

## 2025-06-16 RX ADMIN — HYDROCODONE BITARTRATE AND ACETAMINOPHEN 1 TABLET: 5; 325 TABLET ORAL at 10:06

## 2025-06-16 RX ADMIN — PIPERACILLIN SODIUM AND TAZOBACTAM SODIUM 4.5 G: 4; .5 INJECTION, SOLUTION INTRAVENOUS at 05:31

## 2025-06-16 RX ADMIN — HEPARIN SODIUM 5000 UNITS: 5000 INJECTION, SOLUTION INTRAVENOUS; SUBCUTANEOUS at 17:41

## 2025-06-16 RX ADMIN — OXYBUTYNIN CHLORIDE 2.5 MG: 5 TABLET ORAL at 08:24

## 2025-06-16 RX ADMIN — PANTOPRAZOLE SODIUM 40 MG: 40 TABLET, DELAYED RELEASE ORAL at 06:17

## 2025-06-16 RX ADMIN — HYDROCODONE BITARTRATE AND ACETAMINOPHEN 1 TABLET: 5; 325 TABLET ORAL at 17:41

## 2025-06-16 RX ADMIN — LEVOTHYROXINE SODIUM 150 MCG: 150 TABLET ORAL at 05:31

## 2025-06-16 RX ADMIN — PIPERACILLIN SODIUM AND TAZOBACTAM SODIUM 4.5 G: 4; .5 INJECTION, SOLUTION INTRAVENOUS at 17:41

## 2025-06-16 RX ADMIN — POLYETHYLENE GLYCOL 3350 17 G: 17 POWDER, FOR SOLUTION ORAL at 08:24

## 2025-06-16 RX ADMIN — PIPERACILLIN SODIUM AND TAZOBACTAM SODIUM 4.5 G: 4; .5 INJECTION, SOLUTION INTRAVENOUS at 12:12

## 2025-06-16 RX ADMIN — HEPARIN SODIUM 5000 UNITS: 5000 INJECTION, SOLUTION INTRAVENOUS; SUBCUTANEOUS at 02:45

## 2025-06-16 RX ADMIN — DONEPEZIL HYDROCHLORIDE 5 MG: 5 TABLET, FILM COATED ORAL at 20:12

## 2025-06-16 RX ADMIN — VANCOMYCIN 1250 MG: 1.75 INJECTION, SOLUTION INTRAVENOUS at 20:12

## 2025-06-16 SDOH — ECONOMIC STABILITY: FOOD INSECURITY: WITHIN THE PAST 12 MONTHS, YOU WORRIED THAT YOUR FOOD WOULD RUN OUT BEFORE YOU GOT THE MONEY TO BUY MORE.: NEVER TRUE

## 2025-06-16 SDOH — HEALTH STABILITY: MENTAL HEALTH: HOW OFTEN DO YOU HAVE A DRINK CONTAINING ALCOHOL?: PATIENT UNABLE TO ANSWER

## 2025-06-16 SDOH — SOCIAL STABILITY: SOCIAL NETWORK: HOW OFTEN DO YOU GET TOGETHER WITH FRIENDS OR RELATIVES?: MORE THAN THREE TIMES A WEEK

## 2025-06-16 SDOH — ECONOMIC STABILITY: HOUSING INSECURITY: IN THE PAST 12 MONTHS, HOW MANY TIMES HAVE YOU MOVED WHERE YOU WERE LIVING?: 0

## 2025-06-16 SDOH — HEALTH STABILITY: PHYSICAL HEALTH: ON AVERAGE, HOW MANY DAYS PER WEEK DO YOU ENGAGE IN MODERATE TO STRENUOUS EXERCISE (LIKE A BRISK WALK)?: 0 DAYS

## 2025-06-16 SDOH — SOCIAL STABILITY: SOCIAL INSECURITY
WITHIN THE LAST YEAR, HAVE YOU BEEN RAPED OR FORCED TO HAVE ANY KIND OF SEXUAL ACTIVITY BY YOUR PARTNER OR EX-PARTNER?: PATIENT UNABLE TO ANSWER

## 2025-06-16 SDOH — SOCIAL STABILITY: SOCIAL INSECURITY: WITHIN THE LAST YEAR, HAVE YOU BEEN AFRAID OF YOUR PARTNER OR EX-PARTNER?: PATIENT UNABLE TO ANSWER

## 2025-06-16 SDOH — ECONOMIC STABILITY: FOOD INSECURITY: HOW HARD IS IT FOR YOU TO PAY FOR THE VERY BASICS LIKE FOOD, HOUSING, MEDICAL CARE, AND HEATING?: NOT HARD AT ALL

## 2025-06-16 SDOH — SOCIAL STABILITY: SOCIAL INSECURITY
WITHIN THE LAST YEAR, HAVE YOU BEEN KICKED, HIT, SLAPPED, OR OTHERWISE PHYSICALLY HURT BY YOUR PARTNER OR EX-PARTNER?: PATIENT UNABLE TO ANSWER

## 2025-06-16 SDOH — SOCIAL STABILITY: SOCIAL INSECURITY: ARE YOU MARRIED, WIDOWED, DIVORCED, SEPARATED, NEVER MARRIED, OR LIVING WITH A PARTNER?: PATIENT UNABLE TO ANSWER

## 2025-06-16 SDOH — SOCIAL STABILITY: SOCIAL NETWORK
DO YOU BELONG TO ANY CLUBS OR ORGANIZATIONS SUCH AS CHURCH GROUPS, UNIONS, FRATERNAL OR ATHLETIC GROUPS, OR SCHOOL GROUPS?: NO

## 2025-06-16 SDOH — SOCIAL STABILITY: SOCIAL NETWORK
IN A TYPICAL WEEK, HOW MANY TIMES DO YOU TALK ON THE PHONE WITH FAMILY, FRIENDS, OR NEIGHBORS?: MORE THAN THREE TIMES A WEEK

## 2025-06-16 SDOH — ECONOMIC STABILITY: HOUSING INSECURITY: AT ANY TIME IN THE PAST 12 MONTHS, WERE YOU HOMELESS OR LIVING IN A SHELTER (INCLUDING NOW)?: NO

## 2025-06-16 SDOH — HEALTH STABILITY: MENTAL HEALTH
DO YOU FEEL STRESS - TENSE, RESTLESS, NERVOUS, OR ANXIOUS, OR UNABLE TO SLEEP AT NIGHT BECAUSE YOUR MIND IS TROUBLED ALL THE TIME - THESE DAYS?: NOT AT ALL

## 2025-06-16 SDOH — ECONOMIC STABILITY: INCOME INSECURITY: IN THE PAST 12 MONTHS HAS THE ELECTRIC, GAS, OIL, OR WATER COMPANY THREATENED TO SHUT OFF SERVICES IN YOUR HOME?: NO

## 2025-06-16 SDOH — ECONOMIC STABILITY: HOUSING INSECURITY: IN THE LAST 12 MONTHS, WAS THERE A TIME WHEN YOU WERE NOT ABLE TO PAY THE MORTGAGE OR RENT ON TIME?: NO

## 2025-06-16 SDOH — HEALTH STABILITY: MENTAL HEALTH: HOW MANY DRINKS CONTAINING ALCOHOL DO YOU HAVE ON A TYPICAL DAY WHEN YOU ARE DRINKING?: PATIENT UNABLE TO ANSWER

## 2025-06-16 SDOH — ECONOMIC STABILITY: FOOD INSECURITY: WITHIN THE PAST 12 MONTHS, THE FOOD YOU BOUGHT JUST DIDN'T LAST AND YOU DIDN'T HAVE MONEY TO GET MORE.: NEVER TRUE

## 2025-06-16 SDOH — HEALTH STABILITY: PHYSICAL HEALTH
HOW OFTEN DO YOU NEED TO HAVE SOMEONE HELP YOU WHEN YOU READ INSTRUCTIONS, PAMPHLETS, OR OTHER WRITTEN MATERIAL FROM YOUR DOCTOR OR PHARMACY?: PATIENT UNABLE TO RESPOND

## 2025-06-16 SDOH — HEALTH STABILITY: MENTAL HEALTH: HOW OFTEN DO YOU HAVE SIX OR MORE DRINKS ON ONE OCCASION?: PATIENT UNABLE TO ANSWER

## 2025-06-16 SDOH — SOCIAL STABILITY: SOCIAL NETWORK: HOW OFTEN DO YOU ATTEND CHURCH OR RELIGIOUS SERVICES?: NEVER

## 2025-06-16 SDOH — SOCIAL STABILITY: SOCIAL NETWORK: HOW OFTEN DO YOU ATTEND MEETINGS OF THE CLUBS OR ORGANIZATIONS YOU BELONG TO?: NEVER

## 2025-06-16 SDOH — HEALTH STABILITY: PHYSICAL HEALTH: ON AVERAGE, HOW MANY MINUTES DO YOU ENGAGE IN EXERCISE AT THIS LEVEL?: 0 MIN

## 2025-06-16 SDOH — SOCIAL STABILITY: SOCIAL INSECURITY
WITHIN THE LAST YEAR, HAVE YOU BEEN HUMILIATED OR EMOTIONALLY ABUSED IN OTHER WAYS BY YOUR PARTNER OR EX-PARTNER?: PATIENT UNABLE TO ANSWER

## 2025-06-16 SDOH — ECONOMIC STABILITY: TRANSPORTATION INSECURITY: IN THE PAST 12 MONTHS, HAS LACK OF TRANSPORTATION KEPT YOU FROM MEDICAL APPOINTMENTS OR FROM GETTING MEDICATIONS?: NO

## 2025-06-16 ASSESSMENT — COGNITIVE AND FUNCTIONAL STATUS - GENERAL
DRESSING REGULAR UPPER BODY CLOTHING: A LOT
HELP NEEDED FOR BATHING: A LOT
MOVING TO AND FROM BED TO CHAIR: A LOT
TURNING FROM BACK TO SIDE WHILE IN FLAT BAD: TOTAL
TOILETING: A LOT
STANDING UP FROM CHAIR USING ARMS: TOTAL
DRESSING REGULAR LOWER BODY CLOTHING: A LOT
MOVING TO AND FROM BED TO CHAIR: TOTAL
DAILY ACTIVITIY SCORE: 15
MOBILITY SCORE: 7
WALKING IN HOSPITAL ROOM: A LOT
WALKING IN HOSPITAL ROOM: TOTAL
STANDING UP FROM CHAIR USING ARMS: A LOT
CLIMB 3 TO 5 STEPS WITH RAILING: TOTAL
MOVING FROM LYING ON BACK TO SITTING ON SIDE OF FLAT BED WITH BEDRAILS: A LOT
MOVING FROM LYING ON BACK TO SITTING ON SIDE OF FLAT BED WITH BEDRAILS: A LITTLE
PERSONAL GROOMING: A LITTLE
CLIMB 3 TO 5 STEPS WITH RAILING: A LOT
MOBILITY SCORE: 13
TURNING FROM BACK TO SIDE WHILE IN FLAT BAD: A LOT

## 2025-06-16 ASSESSMENT — PAIN SCALES - GENERAL
PAINLEVEL_OUTOF10: 0 - NO PAIN
PAINLEVEL_OUTOF10: 6
PAINLEVEL_OUTOF10: 0 - NO PAIN
PAINLEVEL_OUTOF10: 7
PAINLEVEL_OUTOF10: 0 - NO PAIN

## 2025-06-16 ASSESSMENT — ENCOUNTER SYMPTOMS
ALLERGIC/IMMUNOLOGIC NEGATIVE: 1
CONSTITUTIONAL NEGATIVE: 1
WOUND: 1
GASTROINTESTINAL NEGATIVE: 1
PSYCHIATRIC NEGATIVE: 1
ABDOMINAL PAIN: 0
CARDIOVASCULAR NEGATIVE: 1
ENDOCRINE NEGATIVE: 1
NEUROLOGICAL NEGATIVE: 1
HEMATOLOGIC/LYMPHATIC NEGATIVE: 1
EYES NEGATIVE: 1
FEVER: 0
RESPIRATORY NEGATIVE: 1
MUSCULOSKELETAL NEGATIVE: 1

## 2025-06-16 ASSESSMENT — ACTIVITIES OF DAILY LIVING (ADL)
LACK_OF_TRANSPORTATION: NO
LACK_OF_TRANSPORTATION: NO

## 2025-06-16 ASSESSMENT — LIFESTYLE VARIABLES
AUDIT-C TOTAL SCORE: -1
SKIP TO QUESTIONS 9-10: 0

## 2025-06-16 ASSESSMENT — PAIN DESCRIPTION - DESCRIPTORS: DESCRIPTORS: ACHING;DISCOMFORT

## 2025-06-16 ASSESSMENT — PAIN DESCRIPTION - ORIENTATION: ORIENTATION: LEFT

## 2025-06-16 ASSESSMENT — PAIN DESCRIPTION - LOCATION: LOCATION: HIP

## 2025-06-16 ASSESSMENT — PAIN - FUNCTIONAL ASSESSMENT
PAIN_FUNCTIONAL_ASSESSMENT: 0-10
PAIN_FUNCTIONAL_ASSESSMENT: 0-10

## 2025-06-16 NOTE — PROGRESS NOTES
Occupational Therapy                 Therapy Communication Note    Patient Name: Pati Frederick  MRN: [id number]  Department: [address]  Room: [facility]415  Today's Date: 6/16/2025     Discipline: Occupational Therapy    Missed Visit: Yes      Missed Visit Reason:  OT orders received and pt chart reviewed. Pt requesting OT eval at later time due to visitors and not wanting to perform mobility at this time. Will re-attempt tomorrow.     Missed Time: Attempt    Time: 15:10

## 2025-06-16 NOTE — PROGRESS NOTES
Physical Therapy    Physical Therapy Evaluation    Patient Name: Pati Frederick  MRN: [id number]  Department: [address]  Room: [facility]415  Today's Date: 6/16/2025   Time Calculation  Start Time: 0902  Stop Time: 0926  Time Calculation (min): 24 min    Assessment/Plan   PT Assessment  PT Assessment Results: Decreased strength, Decreased mobility, Decreased cognition, Pain, Decreased range of motion, Decreased endurance, Impaired balance  Rehab Prognosis: Good  Barriers to Discharge Home: Physical needs  Physical Needs: Returning to long-term care/other facility (for rehab)  Strengths: Support of extended family/friends, Living arrangement secure, Attitude of self, Access to adaptive/assistive products  Barriers to Participation: Comorbidities, Ability to acquire knowledge, Insight into problems  End of Session Communication: Bedside nurse  Assessment Comment: She presented with the above listed impairments (see Assessment Results). Pt required maxA x1 during LIMITED functional mobility; an increase from reported baseline. Pt would benefit from continued skilled PT services for maximizing independence and safety prior to & after discharge (MODERATE intensity).  End of Session Patient Position: Bed, 3 rail up, Alarm on (call light and needs within reach)    IP OR SWING BED PT PLAN  Inpatient or Swing Bed: Inpatient    PT Plan  Treatment/Interventions: Bed mobility, Transfer training, Gait training, Strengthening, Therapeutic exercise, Therapeutic activity, Balance training  PT Plan: Ongoing PT  PT Frequency: 4 times per week  PT Discharge Recommendations: Moderate intensity level of continued care  PT Recommended Transfer Status: Assist x2, Assistive device  PT - OK to Discharge: Yes    Subjective     PT Visit Info:  PT Received On: 06/16/25  General Visit Information:  General  Reason for Referral: Impaired functional mobility due to decreased muscular strength. This 79 year old was admitted from post acute facility  "(rehab) for RLE cellulitis.  Past Medical History Relevant to Rehab: None on file. Per chart review: dementia.  Family/Caregiver Present: No  Prior to Session Communication: Bedside nurse  Patient Position Received: Bed, 3 rail up, Alarm on  General Comment: Cleared by RN for participation. Pt agreed to session and fully engaged throughout. Mobility limited by c/o pain.    Home Living:  Pt was a questionable historian 2° dementia.  Type of Home: House  Lives With: Adult children (dtr Leonela)  Home Adaptive Equipment: Hospital bed, Wheelchair-manual, Walker rolling or standard (FWW)  Home Layout: Able to live on main level with bedroom/bathroom  Entrance Stairs-Rails: Both ('far apart')  Entrance Stairs-Number of Steps: 5  Bathroom Shower/Tub: Tub/shower unit  Bathroom Equipment: Grab bars in shower, Tub transfer bench    Addendum 6/18/25: per T[facility] note, \"Patient lives alone in a house. Most recently patient was at [facility] for rehab. Per Leonela, patient will not return there and will go home. Patient has been chair bound for 25 years. Patient has an aide service and then her daughter and granddaughter assist her. Family transports patient.\"    Prior Level of Function:  Prior Function Per Pt Report; questionable historian 2° dementia.  Receives Help From: Family (dtr)  Homemaking Assistance: Needs assistance (likely dependent)  Ambulatory Assistance: Needs assistance  Transfers:  (hospital bed<>WC with 'some' assist)  Gait:  (WC bound; ambulated \"very\" limited distances and only \"sometimes\" with FWW)  Stairs:  (likely rail and at least CGA)  Prior Function Comments: (-) driving, Leonela provided transportation    Precautions:  Precautions  Hearing/Visual Limitations: hearing seemed WFL; glasses donned: \"sometimes for watcing TV\" and reading.  Medical Precautions: Fall precautions  Precautions Comment: female external urinary catheter; telemetry; PIV (hep-locked)     Objective   Pain:  Pain " "Assessment  0-10 (Numeric) Pain Score: 6  Pain Type: Acute pain  Pain Location: Hip  Pain Orientation: Left  Pain Interventions: Repositioned  Response to Interventions: Resting quietly    With bed mobiliy she c/o back pain (FLACC 8/10); resting quietly once return to laying in bed.    Cognition:  Cognition  Overall Cognitive Status: Impaired at baseline (dementia per chart review)  Orientation Level: Disoriented to time (stated \"25\" for year but required significant time & 1 VC to state correct month)    General Assessments:  General Observation  Mepelix dressings at R foot & anterior lower leg. Small amount of fresh blood (~2 cm diameter) noted on pillow used to float heels toward end of session; RN informed.    Activity Tolerance  Endurance: Tolerates less than 10 min exercise, no significant change in vital signs  Activity Tolerance Comments: Back pain was the limiting factor to mobility although she also endorsed BLE pain    Sensation  Sensation Comment: Not tested; pt denied paresthesia at baseline    ROM  BLE AROM: limited assessment, grossly WFL, anticipate bilat hamstring shortening    Strength  Strength Comments: BLE: grossly >/=3+/5    Coordination  Movements are Fluid and Coordinated: No (Limited visual assessment. Slowed rate of global movements; guarded movements due to pain.)    Postural Control  Postural Control:  (Unable to assess. At EOB, R lateral trunk flexion on R forearm due to c/o back pain.)    Static Sitting Balance  Static Sitting-Balance Support: Feet unsupported (R forearm WB-ing)  Static Sitting-Level of Assistance: Contact guard  Static Sitting-Comment/Number of Minutes: </=45 sec       Functional Assessments:  Bed Mobility  Bed Mobility: Yes  Bed Mobility 1  Bed Mobility 1: Supine to sitting  Level of Assistance 1: Maximum assistance (to BLE, pelvis via draw sheet, and trunk. Able to minimally initiate BLE & BUE movements. Maximal VCs for sequencing. HOB elevated >40° and used R bed " rail.)    Bed Mobility  2: Sitting to supine  Level of Assistance 2: Moderate assistance (to elevate BLE as she was already in R forearm weightbearing. Minimal VCs. Bed flat, R bed rail up.)    Boost toward HOB in hook lying: dependent x1 at head of bed using Trendelenburg position.      Outcome Measures:  [facility] Basic Mobility  Turning from your back to your side while in a flat bed without using bedrails: A lot  Moving from lying on your back to sitting on the side of a flat bed without using bedrails: Total  Moving to and from bed to chair (including a wheelchair): Total  Standing up from a chair using your arms (e.g. wheelchair or bedside chair): Total  To walk in hospital room: Total  Climbing 3-5 steps with railing: Total  Basic Mobility - Total Score: 7    Encounter Problems       Encounter Problems (Active)       PT Problem       Pt will transition supine<>sit using hospital bed with close S and verbal cues.       Start:  06/16/25    Expected End:  07/02/25            Pt will transfer sit<>stand with FWW & Alejandra x1.       Start:  06/16/25    Expected End:  07/02/25            Pt will ambulate >/=10 ft with FWW & CGA x1.       Start:  06/16/25    Expected End:  07/02/25                   Education Documentation  Mobility Training, taught by Mary Ann Traylor PT at 6/16/2025 11:00 AM.  Learner: Patient  Readiness: Acceptance  Method: Explanation  Response: Needs Reinforcement  Comment: Fall precautions, use of call light, PT role, POC & disposition. Also see cues with mobility.    Education Comments  No comments found.

## 2025-06-16 NOTE — H&P (VIEW-ONLY)
Reason For Consult  left leg cellulitis With a hematoma and pressure necrosis     History Of Present Illness  Pati Frederick is a 79 y.o. female presenting with apparent chest pain, altered mental status. History obtained from interview with the patient in [id number]-A, review of pertinent electronic health record documentation. Patient relates a fall about a week ago, states that she slipped and struck her RLE against potentially some steps or hard object. She is unsure of exact historical details. Apparently in the ED there was concern relating to increased swelling and erythema to this area. A CT scan was obtained and demonstrated soft tissue collection with hounsfield units in the 50-55 range, additionally concern for cellulitis about the posterior aspect of the lower leg. No acute fracture. Presentation WCC 11.7. Patient reports some discomfort to her wound site, it has not become significantly more painful recently  Patient on broad=spectrum antibiotics and Acute Care surgery consulted for evaluation. Patient evaluated by wound care.     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    Surgical History  She has a past surgical history that includes Colectomy (11/12/2015) and Hernia repair (11/12/2015).     Social History  She reports that she has never smoked. She does not have any smokeless tobacco history on file. No history on file for alcohol use and drug use.    Family History  Family History[1]     Allergies  Bactrim [sulfamethoxazole-trimethoprim], Cefepime, Clindamycin, Iodinated contrast media, Levaquin [levofloxacin], Lunesta [eszopiclone], and Vancomycin    Review of Systems   Review of Systems   Constitutional:  Negative for fever.   Gastrointestinal:  Negative for abdominal pain.   Musculoskeletal:  Positive for gait problem.   Skin:  Positive for wound.         Physical Exam  Physical Exam  Constitutional:        "General: She is not in acute distress.     Appearance: She is not toxic-appearing.   Cardiovascular:      Comments: Intact palpable RLE dorsalis pedis pulse  Pulmonary:      Effort: Pulmonary effort is normal.   Abdominal:      Palpations: Abdomen is soft.   Musculoskeletal:      Comments: No R knee swelling or joint line tenderness, no ankle tenderness. Active dorsi/plantar flexion at R ankle. Compartments of the RLE as palpable are soft. There is an ovoid area of swelling to the Right anterolateral mid LE with central eschar. This area is soft, no active bleeding or purulent drainage. There is surrounding erythema, no crepitus   Skin:     General: Skin is warm and dry.      Findings: Erythema present.      Comments: Aforementioned skin changes   Neurological:      Mental Status: She is alert.   Psychiatric:         Behavior: Behavior normal.      Wound 6/15 RLE    Last Recorded Vitals  Blood pressure 137/80, pulse 93, temperature 36.7 °C (98.1 °F), temperature source Oral, resp. rate 18, height 1.651 m (5' 5\"), weight 100 kg (220 lb 7.4 oz), SpO2 97%.    Relevant Results  Lab Results   Component Value Date    WBC 9.0 06/16/2025    HGB 10.3 (L) 06/16/2025    HCT 31.9 (L) 06/16/2025    MCV 88 06/16/2025     06/16/2025      Lab Results   Component Value Date    GLUCOSE 91 06/16/2025    CALCIUM 8.7 06/16/2025     06/16/2025    K 4.6 06/16/2025    CO2 25 06/16/2025     (H) 06/16/2025    BUN 32 (H) 06/16/2025    CREATININE 0.87 06/16/2025     === 06/15/25 ===    CT TIBIA FIBULA RIGHT WO IV CONTRAST    - Impression -  1. Oval-shaped soft tissue attenuation structure in the lateral  subcutaneous soft tissues of the mid fibula with soft tissue defect  in the posterior aspect with attenuation of 50-55 Hounsfield units.  Findings are favored to represent hematoma based on the attenuation.  However, correlate with possible draining pus in this location to  rule out abscess.  2. Subcutaneous fluid " infiltration of the posterior lower leg with  mild skin thickening suggesting cellulitis.    Signed by: Terri Hua 6/15/2025 10:14 AM  Dictation workstation:   [id number]       Assessment/Plan     80 yo F with apparent blunt traumatic injury to anterolateral aspect of the mid Right lower extremity. CT imaging with hounsfield units suggestive of underlying hematoma. Palpable compartments are soft. There appears to be superimposed cellulitis. No associated fracture. There is central eschar and non-viable tissue.    Plan for debridement in OR  NPO at midnight  Appreciate wound care following  On broad-spectrum antibiotics  Ongoing medical management per primary team    I spent 45 minutes in the professional and overall care of this patient.  Case and plan of care reviewed with Dr. Facundo Jenkins PA-C         [1] No family history on file.

## 2025-06-16 NOTE — NURSING NOTE
A complete bed bath given, bed linen changed, pt turned and repositioned, made comfortable. Safety put in place, will monitor.

## 2025-06-16 NOTE — CONSULTS
Reason For Consult  left leg cellulitis With a hematoma and pressure necrosis     History Of Present Illness  Pati Frederick is a 79 y.o. female presenting with apparent chest pain, altered mental status. History obtained from interview with the patient in 415-A, review of pertinent electronic health record documentation. Patient relates a fall about a week ago, states that she slipped and struck her RLE against potentially some steps or hard object. She is unsure of exact historical details. Apparently in the ED there was concern relating to increased swelling and erythema to this area. A CT scan was obtained and demonstrated soft tissue collection with hounsfield units in the 50-55 range, additionally concern for cellulitis about the posterior aspect of the lower leg. No acute fracture. Presentation WCC 11.7. Patient reports some discomfort to her wound site, it has not become significantly more painful recently  Patient on broad=spectrum antibiotics and Acute Care surgery consulted for evaluation. Patient evaluated by wound care.     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    Surgical History  She has a past surgical history that includes Colectomy (11/12/2015) and Hernia repair (11/12/2015).     Social History  She reports that she has never smoked. She does not have any smokeless tobacco history on file. No history on file for alcohol use and drug use.    Family History  Family History[1]     Allergies  Bactrim [sulfamethoxazole-trimethoprim], Cefepime, Clindamycin, Iodinated contrast media, Levaquin [levofloxacin], Lunesta [eszopiclone], and Vancomycin    Review of Systems   Review of Systems   Constitutional:  Negative for fever.   Gastrointestinal:  Negative for abdominal pain.   Musculoskeletal:  Positive for gait problem.   Skin:  Positive for wound.         Physical Exam  Physical Exam  Constitutional:        "General: She is not in acute distress.     Appearance: She is not toxic-appearing.   Cardiovascular:      Comments: Intact palpable RLE dorsalis pedis pulse  Pulmonary:      Effort: Pulmonary effort is normal.   Abdominal:      Palpations: Abdomen is soft.   Musculoskeletal:      Comments: No R knee swelling or joint line tenderness, no ankle tenderness. Active dorsi/plantar flexion at R ankle. Compartments of the RLE as palpable are soft. There is an ovoid area of swelling to the Right anterolateral mid LE with central eschar. This area is soft, no active bleeding or purulent drainage. There is surrounding erythema, no crepitus   Skin:     General: Skin is warm and dry.      Findings: Erythema present.      Comments: Aforementioned skin changes   Neurological:      Mental Status: She is alert.   Psychiatric:         Behavior: Behavior normal.      Wound 6/15 RLE    Last Recorded Vitals  Blood pressure 137/80, pulse 93, temperature 36.7 °C (98.1 °F), temperature source Oral, resp. rate 18, height 1.651 m (5' 5\"), weight 100 kg (220 lb 7.4 oz), SpO2 97%.    Relevant Results  Lab Results   Component Value Date    WBC 9.0 06/16/2025    HGB 10.3 (L) 06/16/2025    HCT 31.9 (L) 06/16/2025    MCV 88 06/16/2025     06/16/2025      Lab Results   Component Value Date    GLUCOSE 91 06/16/2025    CALCIUM 8.7 06/16/2025     06/16/2025    K 4.6 06/16/2025    CO2 25 06/16/2025     (H) 06/16/2025    BUN 32 (H) 06/16/2025    CREATININE 0.87 06/16/2025     === 06/15/25 ===    CT TIBIA FIBULA RIGHT WO IV CONTRAST    - Impression -  1. Oval-shaped soft tissue attenuation structure in the lateral  subcutaneous soft tissues of the mid fibula with soft tissue defect  in the posterior aspect with attenuation of 50-55 Hounsfield units.  Findings are favored to represent hematoma based on the attenuation.  However, correlate with possible draining pus in this location to  rule out abscess.  2. Subcutaneous fluid " infiltration of the posterior lower leg with  mild skin thickening suggesting cellulitis.    Signed by: Terri Hua 6/15/2025 10:14 AM  Dictation workstation:   [id number]       Assessment/Plan     80 yo F with apparent blunt traumatic injury to anterolateral aspect of the mid Right lower extremity. CT imaging with hounsfield units suggestive of underlying hematoma. Palpable compartments are soft. There appears to be superimposed cellulitis. No associated fracture. There is central eschar and non-viable tissue.    Plan for debridement in OR  NPO at midnight  Appreciate wound care following  On broad-spectrum antibiotics  Ongoing medical management per primary team    I spent 45 minutes in the professional and overall care of this patient.  Case and plan of care reviewed with Dr. Facundo Jenkins PA-C         [1] No family history on file.

## 2025-06-16 NOTE — CARE PLAN
The patient's goals for the shift include Iv antibiotics    The clinical goals for the shift include Maintain safety

## 2025-06-16 NOTE — PROGRESS NOTES
Vancomycin Dosing by Pharmacy- FOLLOW UP    Pati Frederick is a 79 y.o. year old female who Pharmacy has been consulted for vancomycin dosing for cellulitis, skin and soft tissue. Based on the patient's indication and renal status this patient is being dosed based on a goal AUC of 400-600.     Renal function is currently stable.    Current vancomycin dose: 750 mg given every 12 hours    Estimated vancomycin AUC on current dose: 574 mg/L.hr ,near the high end 600.     Visit Vitals  /80 (BP Location: Left arm, Patient Position: Lying)   Pulse 93   Temp 36.7 °C (98.1 °F) (Oral)   Resp 18        Lab Results   Component Value Date    CREATININE 0.87 2025    CREATININE 0.90 06/15/2025    CREATININE 1.11 (H) 2025    CREATININE 1.37 (H) 2024        Patient weight is as follows:   Vitals:    06/15/25 0714   Weight: 100 kg (220 lb 7.4 oz)       Cultures:  No results found for the encounter in last 14 days.       I/O last 3 completed shifts:  In: 240 (2.4 mL/kg) [P.O.:240]  Out: 1000 (10 mL/kg) [Urine:1000 (0.3 mL/kg/hr)]  Weight: 100 kg   I/O during current shift:  No intake/output data recorded.    Temp (24hrs), Av.7 °C (98 °F), Min:36.5 °C (97.7 °F), Max:36.7 °C (98.1 °F)      Assessment/Plan    Above goal AUC. Orders placed for new vancomcyin regimen of 1250 mg every 24 hours to begin at 21:00. Dose lowered for approaching the high end of range of 600.    This dosing regimen is predicted by [employer] to result in the following pharmacokinetic parameters:  Loading dose: N/A  Regimen: 1250 mg IV every 24 hours.  Start time: 21:42 on 2025  Exposure target: AUC24 (range) 400-600 mg/L.hr   [id number]: 507 mg/L.hr  AUC24,ss: 489 mg/L.hr  Probability of AUC24 > 400: 86 %  Ctrough,ss: 13.4 mg/L  Probability of Ctrough,ss > 20: 9 %      The next level will be obtained on  at 5:00. May be obtained sooner if clinically indicated.   Will continue to monitor renal function daily while on vancomycin  and order serum creatinine at least every 48 hours if not already ordered.  Follow for continued vancomycin needs, clinical response, and signs/symptoms of toxicity.       Duarte Lai, [facility]

## 2025-06-16 NOTE — CONSULTS
Wound Care Consult     Visit Date: 6/16/2025      Patient Name: Pati Frederick         MRN: [id number]             Reason for Consult: Wound of the Right Anterior Tibial         Wound History: Defer to the patient's chart     Pertinent Labs: Defer to the patient's chart      Assessment:  Wound 06/15/25 Traumatic Tibial Anterior;Right (Active)   Date First Assessed/Time First Assessed: 06/15/25 1558   Primary Wound Type: Traumatic  Location: Tibial  Wound Location Orientation: Anterior;Right      Assessments 6/16/2025  9:02 AM   Present on Admission to Healthcare Facility Yes   Shape irregular   Wound Length (cm) 4.2 cm   Wound Width (cm) 3.1 cm   Wound Surface Area (cm^2) 10.23 cm^2   State of Healing Eschar   Wound Bed Eschar (%) 99 %   Margins Attached edges;Well-defined edges   Drainage Description Serosanguineous   Drainage Amount Scant       Active Orders   Date Order Priority Status Authorizing Provider   06/16/25 1355 Wound Care Anterior;Right Tibial Routine Active ZAHEER Rodríguez-CNP     - Wound Complexity:    Simple     - Cleanse with:    Soap and Water     - Apply::    Medihoney     - Cover with::    Kerlix (Mepitel-One)   Josep at time of wound care consult / assessment of  16.  Current and additionally recommended preventative measures     Foam Padding to all vulnerable bony prominences including but not limited to the sacrum ,and bilateral heels.  Waffle pressure off loading  over-lay to bed surface  Turn and reposition the patient every 2 hours.   Manage and monitor for; friction, shear, and moisture related skin impairments/injuries   Max patient mobility   Promote the patient to assist with turning and repositioning through self regulation as able                     Wound Plan: Wound care recommendations:   Wash wound with soap and water and pat dry.    Apply a Mepitel-One wound cover to the wound bed and apply a thin layer of medihoney to the surface of the Mepitel-One and cover with a dry  4x4,then secure with roll gauze.      * Application of warm compress to the wound site 2-3 times per day for 15-20 minutes at a time to help in reabsorption of the surrounding hematoma.      Sánchez Felix RN  6/16/2025  2:00 PM

## 2025-06-16 NOTE — CARE PLAN
The patient's goals for the shift include Iv antibiotics    The clinical goals for the shift include Comfort and safety.

## 2025-06-16 NOTE — H&P
Chief Complaint Patient presents with a right lower extremity swelling, hematoma    History Of Present Illness  Pati Frederick is a very pleasant 79 y.o. elderly  female presenting with right lower extremity swelling, hematoma.  History is obtained upon review of the medical record.  Per the record, she struck her right lower extremity against something and developed swelling approximately 1 week ago, and family was concerned that the swelling appeared worse and was more red.  Upon arrival, she was tachycardic.  There was concern that she was more confused than baseline.  She has underlying dementia.  Per the record, she told staff members at the nursing facility that she had chest pain, when EMS arrived, she stated that chest pain was due to a cell phone falling onto her chest however with the ED, she reported the chest pain was related to breathing treatment administered overnight however neither of these events occurred.  In the emergency department, initial workup was done.  Temperature 36.7 °C.  Pulse 121.  Respiratory rate 18.  Blood pressure 102/65.  Pulse oximetry 94%.  CMP showed glucose 106.  Sodium 138.  Potassium 4.5.  Chloride 105.  Bicarbonate 25.  Anion a gap 13.  BUN 42.  Creatinine 0.90.  Calcium 9.1.  Albumin 3.3.  Alkaline phosphatase 79.  ALT 9.  AST 13.  Total bilirubin 0.5.  Total protein 6.7.  Lactate 0.8.  Troponin 13.  CBC showed a white blood cell count 11.7.  Hemoglobin 11.2.  Hematocrit 35.6.  Platelet count 300.  Urinalysis showed 500 leukocytes, 11-20 white blood cells, 1+ bacteria.  Urine culture was sent.  Blood cultures were obtained.  CT tibia fibula right without contrast imaging per radiology showed an oval shaped soft tissue attenuation structure in the lateral subcutaneous soft tissues of the mid fibula with soft tissue defect in the posterior aspect with attenuation of 50 to 55 Hounsfield units, findings favored to represent a hematoma based on the attenuation, however  correlate with possible draining pus in the location of to rule out abscess, subcutaneous fluid infiltration of the posterior lower leg with mild given thickening suggesting cellulitis.  She was treated in the emergency department with IV antibiotics.  She was admitted.  General surgery was consulted.  She was evaluated and treated by general surgery.  At the time of my evaluation, she is resting in bed in no acute distress.  She is eating lunch.  She is awake alert oriented x 2.  She is forgetful.  She denies any complaints.  There is bloody drainage noted on the dressing, per nursing - there was drainage earlier, and the dressing was changed.     Past Medical History  Medical History[1]    Surgical History  Surgical History[2]     Social History  She reports that she has never smoked. She does not have any smokeless tobacco history on file. No history on file for alcohol use and drug use.    Family History  Family History[3]     Allergies  Bactrim [sulfamethoxazole-trimethoprim], Cefepime, Clindamycin, Iodinated contrast media, Levaquin [levofloxacin], and Lunesta [eszopiclone]    Review of Systems   Reason unable to perform ROS: Limited due to dementia.   Constitutional: Negative.    HENT: Negative.     Eyes: Negative.    Respiratory: Negative.     Cardiovascular: Negative.    Gastrointestinal: Negative.    Endocrine: Negative.    Genitourinary: Negative.    Musculoskeletal: Negative.    Skin:  Positive for wound.   Allergic/Immunologic: Negative.    Neurological: Negative.    Hematological: Negative.    Psychiatric/Behavioral: Negative.     All other systems reviewed and are negative.       Physical Exam  Vitals and nursing note reviewed.   Constitutional:       General: She is not in acute distress.     Appearance: Normal appearance. She is normal weight. She is not ill-appearing, toxic-appearing or diaphoretic.   HENT:      Head: Normocephalic and atraumatic.      Right Ear: External ear normal.      Left Ear:  "External ear normal.      Nose: Nose normal.      Mouth/Throat:      Mouth: Mucous membranes are moist.      Pharynx: Oropharynx is clear.   Eyes:      Extraocular Movements: Extraocular movements intact.      Conjunctiva/sclera: Conjunctivae normal.      Pupils: Pupils are equal, round, and reactive to light.   Cardiovascular:      Rate and Rhythm: Normal rate and regular rhythm.      Pulses: Normal pulses.      Heart sounds: Normal heart sounds. No murmur heard.  Pulmonary:      Effort: Pulmonary effort is normal. No respiratory distress.      Breath sounds: Normal breath sounds. No wheezing, rhonchi or rales.      Comments: Room air.  Abdominal:      General: Bowel sounds are normal. There is no distension.      Palpations: Abdomen is soft.      Tenderness: There is no abdominal tenderness. There is no guarding.   Genitourinary:     Comments: Rectal examination deferred  Musculoskeletal:         General: Swelling present. Normal range of motion.      Cervical back: Normal range of motion and neck supple.      Right lower leg: Edema present.      Comments: Right lower extremity with dressing intact, bloody drainage noted. (Media reviewed).   Skin:     General: Skin is warm and dry.      Capillary Refill: Capillary refill takes less than 2 seconds.      Findings: Erythema present.      Comments: Right lower extremity with dressing intact, bloody drainage noted. (Media reviewed).    Neurological:      General: No focal deficit present.      Mental Status: She is alert. She is disoriented.   Psychiatric:         Mood and Affect: Mood normal.         Behavior: Behavior normal.       Last Recorded Vitals  Blood pressure 137/80, pulse 93, temperature 36.7 °C (98.1 °F), temperature source Oral, resp. rate 18, height 1.651 m (5' 5\"), weight 100 kg (220 lb 7.4 oz), SpO2 97%.    Relevant Results  Results for orders placed or performed during the hospital encounter of 06/15/25 (from the past 24 hours)   CBC   Result Value " Ref Range    WBC 9.0 4.4 - 11.3 x10*3/uL    nRBC 0.0 0.0 - 0.0 /100 WBCs    RBC 3.64 (L) 4.00 - 5.20 x10*6/uL    Hemoglobin 10.3 (L) 12.0 - 16.0 g/dL    Hematocrit 31.9 (L) 36.0 - 46.0 %    MCV 88 80 - 100 fL    MCH 28.3 26.0 - 34.0 pg    MCHC 32.3 32.0 - 36.0 g/dL    RDW 14.6 (H) 11.5 - 14.5 %    Platelets 260 150 - 450 x10*3/uL   Comprehensive metabolic panel   Result Value Ref Range    Glucose 91 74 - 99 mg/dL    Sodium 139 136 - 145 mmol/L    Potassium 4.6 3.5 - 5.3 mmol/L    Chloride 109 (H) 98 - 107 mmol/L    Bicarbonate 25 21 - 32 mmol/L    Anion Gap 10 10 - 20 mmol/L    Urea Nitrogen 32 (H) 6 - 23 mg/dL    Creatinine 0.87 0.50 - 1.05 mg/dL    eGFR 67 >60 mL/min/1.73m*2    Calcium 8.7 8.6 - 10.3 mg/dL    Albumin 2.9 (L) 3.4 - 5.0 g/dL    Alkaline Phosphatase 64 33 - 136 U/L    Total Protein 5.9 (L) 6.4 - 8.2 g/dL    AST 12 9 - 39 U/L    Bilirubin, Total 0.5 0.0 - 1.2 mg/dL    ALT 9 7 - 45 U/L   Vancomycin   Result Value Ref Range    Vancomycin 22.2 (H) 5.0 - 20.0 ug/mL     No results found for the last 90 days.    ECG 12 lead  Result Date: 6/16/2025  Sinus tachycardia Otherwise normal ECG No previous ECGs available    CT tibia fibula right wo IV contrast  Result Date: 6/15/2025  Interpreted By:  Terri Hua, STUDY: CT of tibia and fibula without contrast.   INDICATION: Signs/Symptoms:Abscess vs hematoma to lateral edge with surrounding erythema   COMPARISON: None.   ACCESSION NUMBER(S): [id number]   ORDERING CLINICIAN: MYA BALDWIN   TECHNIQUE: Contiguous axial CT images were obtained at  2 mm slice thickness from the level of distal femur to the level of the foot without intravenous contrast administration. Coronal and sagittal reconstructions were performed.   FINDINGS: SOFT TISSUES:   An oval-shaped soft tissue attenuation structure visualized in the lateral subcutaneous soft tissues of the mid fibula, measuring 6.0 x 2.3 x 8.8 cm in AP, transverse, and craniocaudal dimensions respectively.  This has average attenuation of approximately 50-55 Hounsfield units. In the posterior aspect of this, there is a soft tissue defect noted. There is subcutaneous fluid infiltration of the posterior lower leg with mild skin thickening suggestive of cellulitis.   Muscles and tendons of the pelvis, thigh, and calf/lower leg are within normal limits without significant atrophy.   BONES: No acute fracture or malalignment.   JOINTS: Joint spaces of the knee and ankle are within normal limits without significant joint space loss or joint effusion.   MISCELLANEOUS: None.       1. Oval-shaped soft tissue attenuation structure in the lateral subcutaneous soft tissues of the mid fibula with soft tissue defect in the posterior aspect with attenuation of 50-55 Hounsfield units. Findings are favored to represent hematoma based on the attenuation. However, correlate with possible draining pus in this location to rule out abscess. 2. Subcutaneous fluid infiltration of the posterior lower leg with mild skin thickening suggesting cellulitis.   Signed by: Terri Hua 6/15/2025 10:14 AM Dictation workstation:   [id number]      Scheduled medications  Scheduled Medications[4]  Continuous medications  Continuous Medications[5]  PRN medications  PRN Medications[6]    ASSESSMENT:  Right lower extremity injury / trauma  Right lower extremity cellulitis  Right lower extremity hematoma  Normocytic anemia  Iron deficiency  Urinary tract infection  Leukocytosis resolved  Toxic encephalopathy  Dementia  Hypothyroidism  Overactive bladder    PLAN:  General surgery input appreciated.  N.p.o. at midnight for debridement in OR.  Wound care per wound care nursing.  Broad-spectrum IV antibiotics.  Zosyn.  Vancomycin.  Pharmacy dosing Vancomycin.  Monitor Vancomycin level and renal function.  Follow-up cultures.  Follow-up urine culture.  Blood cultures.  Temperature and white blood cell count.  Pain control.  As needed Tylenol.  Monitor H&H.   BMP, CBC in am.  Monitor vital signs.  Monitor blood pressure.  Monitor heart rate.  Continue current medications.  PT/OT.  Fall precautions.  Up with assistance only.  Bed and chair alarm at all times.  Pressure ulcer prevention measures.  Turn and reposition every 2 hours and as needed. Heels off bed.  Offloading.  DVT prophylaxis.  GI prophylaxis.  Supportive care.  Patient reassured.  Case management following for discharge planning.  We will take DVT, fall, aspiration and decubitus precautions during her stay in the hospital.  The plan has been discussed with the patient.  She is agreeable.  Orders written per Dr. Rich.  Any additions or modifications at his discretion.      Sofiya Marquez, APRN-CNP         [1]   Past Medical History:  Diagnosis Date    Personal history of other malignant neoplasm of large intestine 11/12/2015    History of malignant neoplasm of colon   [2]   Past Surgical History:  Procedure Laterality Date    COLECTOMY  11/12/2015    Partial Colectomy    HERNIA REPAIR  11/12/2015    Hernia Repair   [3] No family history on file.  [4] donepezil, 5 mg, oral, Nightly  ferrous sulfate, 1 tablet, oral, Daily with breakfast  heparin (porcine), 5,000 Units, subcutaneous, q8h  levothyroxine, 150 mcg, oral, Daily  lidocaine, 1 patch, transdermal, Daily  oxyBUTYnin, 2.5 mg, oral, BID  pantoprazole, 40 mg, oral, Daily before breakfast  piperacillin-tazobactam, 4.5 g, intravenous, q6h  polyethylene glycol, 17 g, oral, Daily  vancomycin, 1,250 mg, intravenous, q24h  [5]    [6] PRN medications: acetaminophen **OR** acetaminophen **OR** acetaminophen, benzocaine-menthol, dextromethorphan-guaifenesin, guaiFENesin, HYDROcodone-acetaminophen, hydrOXYzine HCL, melatonin, ondansetron **OR** ondansetron, polyethylene glycol, vancomycin

## 2025-06-16 NOTE — PROGRESS NOTES
06/16/25 1311   Physical Activity   On average, how many days per week do you engage in moderate to strenuous exercise (like a brisk walk)? 0 days   On average, how many minutes do you engage in exercise at this level? 0 min   Financial Resource Strain   How hard is it for you to pay for the very basics like food, housing, medical care, and heating? Not hard   Housing Stability   In the last 12 months, was there a time when you were not able to pay the mortgage or rent on time? N   In the past 12 months, how many times have you moved where you were living? 0   At any time in the past 12 months, were you homeless or living in a shelter (including now)? N   Transportation Needs   In the past 12 months, has lack of transportation kept you from medical appointments or from getting medications? no   In the past 12 months, has lack of transportation kept you from meetings, work, or from getting things needed for daily living? No   Food Insecurity   Within the past 12 months, you worried that your food would run out before you got the money to buy more. Never true   Within the past 12 months, the food you bought just didn't last and you didn't have money to get more. Never true   Stress   Do you feel stress - tense, restless, nervous, or anxious, or unable to sleep at night because your mind is troubled all the time - these days? Not at all   Social Connections   In a typical week, how many times do you talk on the phone with family, friends, or neighbors? More than 3   How often do you get together with friends or relatives? More than 3   How often do you attend Rastafarian or Yarsani services? Never   Do you belong to any clubs or organizations such as Rastafarian groups, unions, fraternal or athletic groups, or school groups? No   How often do you attend meetings of the clubs or organizations you belong to? Never   Are you , , , , never , or living with a partner? Pt Unable   Intimate  Partner Violence   Within the last year, have you been afraid of your partner or ex-partner? Pt Unable   Within the last year, have you been humiliated or emotionally abused in other ways by your partner or ex-partner? Pt Unable   Within the last year, have you been kicked, hit, slapped, or otherwise physically hurt by your partner or ex-partner? Pt Unable   Within the last year, have you been raped or forced to have any kind of sexual activity by your partner or ex-partner? Pt Unable   Alcohol Use   Q1: How often do you have a drink containing alcohol? Pt Unable   Q2: How many drinks containing alcohol do you have on a typical day when you are drinking? Pt Unable   Q3: How often do you have six or more drinks on one occasion? Pt Unable   Utilities   In the past 12 months has the electric, gas, oil, or water company threatened to shut off services in your home? No   Health Literacy   How often do you need to have someone help you when you read instructions, pamphlets, or other written material from your doctor or pharmacy? Patient unable to respond   Follow-Ups   We make community resources available to all of our patients to assist with everyday needs. We may be able to connect you with those resources. Would you be interested? N

## 2025-06-16 NOTE — NURSING NOTE
Wound care nurse rounded on patient this morning, gave recommendations. Wound care provided, wound bed cleansed,medihoney and mepiteal applied and wrapped with Kerlix. Foam heel protectors applied for preventive measures and patient placed on waffle mattress. Will continue to assess and provide nursing care.

## 2025-06-16 NOTE — PROGRESS NOTES
TCC spoke to patient's daughter, Leonela. Assessment complete. Patient lives alone in a house. Most recently patient was at [facility] for rehab. Per Leonela, patient will not return there and will go home. Patient has been chair bound for 25 years. Patient has an aide service and then her daughter and granddaughter assist her. Family transports patient. Patient follows PCP at [facility]. At this time the discharge plan is for patient to go home with family assistance and HHA. Will follow.      06/16/25 1314   Discharge Planning   Living Arrangements Alone   Support Systems Children;Home care staff   Type of Residence Private residence   Home or Post Acute Services In home services   Type of Home Care Services Home health aide   Expected Discharge Disposition Home Health  ([place])   Does the patient need discharge transport arranged? Yes   RoundTrip coordination needed? Yes   Financial Resource Strain   How hard is it for you to pay for the very basics like food, housing, medical care, and heating? Not hard   Housing Stability   In the last 12 months, was there a time when you were not able to pay the mortgage or rent on time? N   In the past 12 months, how many times have you moved where you were living? 0   At any time in the past 12 months, were you homeless or living in a shelter (including now)? N   Transportation Needs   In the past 12 months, has lack of transportation kept you from medical appointments or from getting medications? no   In the past 12 months, has lack of transportation kept you from meetings, work, or from getting things needed for daily living? No

## 2025-06-17 ENCOUNTER — ANESTHESIA EVENT (OUTPATIENT)
Dept: OPERATING ROOM | Facility: HOSPITAL | Age: 80
End: 2025-06-17
Payer: MEDICAID

## 2025-06-17 ENCOUNTER — APPOINTMENT (OUTPATIENT)
Dept: RADIOLOGY | Facility: HOSPITAL | Age: 80
End: 2025-06-17
Payer: MEDICAID

## 2025-06-17 ENCOUNTER — APPOINTMENT (OUTPATIENT)
Dept: CARDIOLOGY | Facility: HOSPITAL | Age: 80
End: 2025-06-17
Payer: MEDICAID

## 2025-06-17 ENCOUNTER — ANESTHESIA (OUTPATIENT)
Dept: OPERATING ROOM | Facility: HOSPITAL | Age: 80
End: 2025-06-17
Payer: MEDICAID

## 2025-06-17 PROBLEM — F03.90 DEMENTIA: Status: ACTIVE | Noted: 2025-06-17

## 2025-06-17 PROBLEM — E03.9 HYPOTHYROIDISM: Status: ACTIVE | Noted: 2025-06-17

## 2025-06-17 PROBLEM — I48.92 ATRIAL FLUTTER (MULTI): Status: ACTIVE | Noted: 2025-06-17

## 2025-06-17 LAB
ALBUMIN SERPL BCP-MCNC: 2.9 G/DL (ref 3.4–5)
ALP SERPL-CCNC: 58 U/L (ref 33–136)
ALT SERPL W P-5'-P-CCNC: 10 U/L (ref 7–45)
ANION GAP SERPL CALCULATED.3IONS-SCNC: 12 MMOL/L (ref 10–20)
AST SERPL W P-5'-P-CCNC: 17 U/L (ref 9–39)
ATRIAL RATE: 122 BPM
BILIRUB DIRECT SERPL-MCNC: 0 MG/DL (ref 0–0.3)
BILIRUB SERPL-MCNC: 0.3 MG/DL (ref 0–1.2)
BNP SERPL-MCNC: 292 PG/ML (ref 0–99)
BUN SERPL-MCNC: 29 MG/DL (ref 6–23)
CALCIUM SERPL-MCNC: 8.7 MG/DL (ref 8.6–10.3)
CARDIAC TROPONIN I PNL SERPL HS: 12 NG/L (ref 0–13)
CHLORIDE SERPL-SCNC: 107 MMOL/L (ref 98–107)
CO2 SERPL-SCNC: 25 MMOL/L (ref 21–32)
CREAT SERPL-MCNC: 0.99 MG/DL (ref 0.5–1.05)
EGFRCR SERPLBLD CKD-EPI 2021: 57 ML/MIN/1.73M*2
ERYTHROCYTE [DISTWIDTH] IN BLOOD BY AUTOMATED COUNT: 14.6 % (ref 11.5–14.5)
GLUCOSE SERPL-MCNC: 97 MG/DL (ref 74–99)
HCT VFR BLD AUTO: 33.8 % (ref 36–46)
HGB BLD-MCNC: 10.4 G/DL (ref 12–16)
MCH RBC QN AUTO: 28.4 PG (ref 26–34)
MCHC RBC AUTO-ENTMCNC: 30.8 G/DL (ref 32–36)
MCV RBC AUTO: 92 FL (ref 80–100)
NRBC BLD-RTO: 0 /100 WBCS (ref 0–0)
PLATELET # BLD AUTO: 255 X10*3/UL (ref 150–450)
POTASSIUM SERPL-SCNC: 5 MMOL/L (ref 3.5–5.3)
PR INTERVAL: 208 MS
PROT SERPL-MCNC: 5.7 G/DL (ref 6.4–8.2)
Q ONSET: 220 MS
QRS COUNT: 20 BEATS
QRS DURATION: 80 MS
QT INTERVAL: 306 MS
QTC CALCULATION(BAZETT): 436 MS
QTC FREDERICIA: 387 MS
R AXIS: 7 DEGREES
RBC # BLD AUTO: 3.66 X10*6/UL (ref 4–5.2)
SODIUM SERPL-SCNC: 139 MMOL/L (ref 136–145)
T AXIS: 55 DEGREES
T OFFSET: 373 MS
VANCOMYCIN SERPL-MCNC: 20.8 UG/ML (ref 5–20)
VENTRICULAR RATE: 122 BPM
WBC # BLD AUTO: 8.1 X10*3/UL (ref 4.4–11.3)

## 2025-06-17 PROCEDURE — 0JBN0ZZ EXCISION OF RIGHT LOWER LEG SUBCUTANEOUS TISSUE AND FASCIA, OPEN APPROACH: ICD-10-PCS | Performed by: STUDENT IN AN ORGANIZED HEALTH CARE EDUCATION/TRAINING PROGRAM

## 2025-06-17 PROCEDURE — 2500000004 HC RX 250 GENERAL PHARMACY W/ HCPCS (ALT 636 FOR OP/ED)

## 2025-06-17 PROCEDURE — 11042 DBRDMT SUBQ TIS 1ST 20SQCM/<: CPT | Performed by: STUDENT IN AN ORGANIZED HEALTH CARE EDUCATION/TRAINING PROGRAM

## 2025-06-17 PROCEDURE — 3600000002 HC OR TIME - INITIAL BASE CHARGE - PROCEDURE LEVEL TWO: Performed by: STUDENT IN AN ORGANIZED HEALTH CARE EDUCATION/TRAINING PROGRAM

## 2025-06-17 PROCEDURE — A27603 PR DRAIN LOWER LEG DEEP ABSC/HEMATOMA: Performed by: ANESTHESIOLOGY

## 2025-06-17 PROCEDURE — 85027 COMPLETE CBC AUTOMATED: CPT | Performed by: NURSE PRACTITIONER

## 2025-06-17 PROCEDURE — 93010 ELECTROCARDIOGRAM REPORT: CPT | Performed by: INTERNAL MEDICINE

## 2025-06-17 PROCEDURE — 2500000005 HC RX 250 GENERAL PHARMACY W/O HCPCS: Performed by: INTERNAL MEDICINE

## 2025-06-17 PROCEDURE — 27603 DRAIN LOWER LEG LESION: CPT | Performed by: STUDENT IN AN ORGANIZED HEALTH CARE EDUCATION/TRAINING PROGRAM

## 2025-06-17 PROCEDURE — 2500000004 HC RX 250 GENERAL PHARMACY W/ HCPCS (ALT 636 FOR OP/ED): Performed by: INTERNAL MEDICINE

## 2025-06-17 PROCEDURE — 74176 CT ABD & PELVIS W/O CONTRAST: CPT

## 2025-06-17 PROCEDURE — 2500000002 HC RX 250 W HCPCS SELF ADMINISTERED DRUGS (ALT 637 FOR MEDICARE OP, ALT 636 FOR OP/ED): Performed by: INTERNAL MEDICINE

## 2025-06-17 PROCEDURE — 84484 ASSAY OF TROPONIN QUANT: CPT | Performed by: NURSE PRACTITIONER

## 2025-06-17 PROCEDURE — 87077 CULTURE AEROBIC IDENTIFY: CPT | Mod: WESLAB | Performed by: INTERNAL MEDICINE

## 2025-06-17 PROCEDURE — 7100000002 HC RECOVERY ROOM TIME - EACH INCREMENTAL 1 MINUTE: Performed by: STUDENT IN AN ORGANIZED HEALTH CARE EDUCATION/TRAINING PROGRAM

## 2025-06-17 PROCEDURE — 2500000004 HC RX 250 GENERAL PHARMACY W/ HCPCS (ALT 636 FOR OP/ED): Performed by: NURSE PRACTITIONER

## 2025-06-17 PROCEDURE — 80202 ASSAY OF VANCOMYCIN: CPT | Performed by: INTERNAL MEDICINE

## 2025-06-17 PROCEDURE — 83880 ASSAY OF NATRIURETIC PEPTIDE: CPT | Performed by: NURSE PRACTITIONER

## 2025-06-17 PROCEDURE — 7100000001 HC RECOVERY ROOM TIME - INITIAL BASE CHARGE: Performed by: STUDENT IN AN ORGANIZED HEALTH CARE EDUCATION/TRAINING PROGRAM

## 2025-06-17 PROCEDURE — 36415 COLL VENOUS BLD VENIPUNCTURE: CPT | Performed by: NURSE PRACTITIONER

## 2025-06-17 PROCEDURE — 11045 DBRDMT SUBQ TISS EACH ADDL: CPT | Performed by: STUDENT IN AN ORGANIZED HEALTH CARE EDUCATION/TRAINING PROGRAM

## 2025-06-17 PROCEDURE — 99100 ANES PT EXTEME AGE<1 YR&>70: CPT | Performed by: ANESTHESIOLOGY

## 2025-06-17 PROCEDURE — 74176 CT ABD & PELVIS W/O CONTRAST: CPT | Performed by: RADIOLOGY

## 2025-06-17 PROCEDURE — 3600000007 HC OR TIME - EACH INCREMENTAL 1 MINUTE - PROCEDURE LEVEL TWO: Performed by: STUDENT IN AN ORGANIZED HEALTH CARE EDUCATION/TRAINING PROGRAM

## 2025-06-17 PROCEDURE — 3700000001 HC GENERAL ANESTHESIA TIME - INITIAL BASE CHARGE: Performed by: STUDENT IN AN ORGANIZED HEALTH CARE EDUCATION/TRAINING PROGRAM

## 2025-06-17 PROCEDURE — 2500000004 HC RX 250 GENERAL PHARMACY W/ HCPCS (ALT 636 FOR OP/ED): Performed by: PHYSICIAN ASSISTANT

## 2025-06-17 PROCEDURE — 71045 X-RAY EXAM CHEST 1 VIEW: CPT | Performed by: RADIOLOGY

## 2025-06-17 PROCEDURE — 80048 BASIC METABOLIC PNL TOTAL CA: CPT | Performed by: NURSE PRACTITIONER

## 2025-06-17 PROCEDURE — 2500000001 HC RX 250 WO HCPCS SELF ADMINISTERED DRUGS (ALT 637 FOR MEDICARE OP): Performed by: INTERNAL MEDICINE

## 2025-06-17 PROCEDURE — 2500000004 HC RX 250 GENERAL PHARMACY W/ HCPCS (ALT 636 FOR OP/ED): Mod: JZ | Performed by: ANESTHESIOLOGY

## 2025-06-17 PROCEDURE — 1200000002 HC GENERAL ROOM WITH TELEMETRY DAILY

## 2025-06-17 PROCEDURE — 99254 IP/OBS CNSLTJ NEW/EST MOD 60: CPT | Performed by: INTERNAL MEDICINE

## 2025-06-17 PROCEDURE — 2500000001 HC RX 250 WO HCPCS SELF ADMINISTERED DRUGS (ALT 637 FOR MEDICARE OP): Performed by: NURSE PRACTITIONER

## 2025-06-17 PROCEDURE — A27603 PR DRAIN LOWER LEG DEEP ABSC/HEMATOMA

## 2025-06-17 PROCEDURE — 3700000002 HC GENERAL ANESTHESIA TIME - EACH INCREMENTAL 1 MINUTE: Performed by: STUDENT IN AN ORGANIZED HEALTH CARE EDUCATION/TRAINING PROGRAM

## 2025-06-17 PROCEDURE — 93005 ELECTROCARDIOGRAM TRACING: CPT

## 2025-06-17 PROCEDURE — 71045 X-RAY EXAM CHEST 1 VIEW: CPT

## 2025-06-17 RX ORDER — ATORVASTATIN CALCIUM 20 MG/1
20 TABLET, FILM COATED ORAL NIGHTLY
Status: DISCONTINUED | OUTPATIENT
Start: 2025-06-17 | End: 2025-06-21 | Stop reason: HOSPADM

## 2025-06-17 RX ORDER — TRAMADOL HYDROCHLORIDE 50 MG/1
50 TABLET, FILM COATED ORAL EVERY 6 HOURS PRN
COMMUNITY

## 2025-06-17 RX ORDER — FENTANYL CITRATE 50 UG/ML
INJECTION, SOLUTION INTRAMUSCULAR; INTRAVENOUS AS NEEDED
Status: DISCONTINUED | OUTPATIENT
Start: 2025-06-17 | End: 2025-06-17

## 2025-06-17 RX ORDER — HYDROMORPHONE HYDROCHLORIDE 0.2 MG/ML
0.2 INJECTION INTRAMUSCULAR; INTRAVENOUS; SUBCUTANEOUS EVERY 5 MIN PRN
Status: DISCONTINUED | OUTPATIENT
Start: 2025-06-17 | End: 2025-06-17 | Stop reason: HOSPADM

## 2025-06-17 RX ORDER — DEXMEDETOMIDINE IN 0.9 % NACL 20 MCG/5ML
SYRINGE (ML) INTRAVENOUS AS NEEDED
Status: DISCONTINUED | OUTPATIENT
Start: 2025-06-17 | End: 2025-06-17

## 2025-06-17 RX ORDER — CITALOPRAM 20 MG/1
20 TABLET ORAL DAILY
Status: DISCONTINUED | OUTPATIENT
Start: 2025-06-17 | End: 2025-06-21 | Stop reason: HOSPADM

## 2025-06-17 RX ORDER — DEXTROMETHORPHAN POLISTIREX 30 MG/5 ML
1 SUSPENSION, EXTENDED RELEASE 12 HR ORAL ONCE
Status: COMPLETED | OUTPATIENT
Start: 2025-06-17 | End: 2025-06-17

## 2025-06-17 RX ORDER — HYDRALAZINE HYDROCHLORIDE 20 MG/ML
5 INJECTION INTRAMUSCULAR; INTRAVENOUS EVERY 30 MIN PRN
Status: DISCONTINUED | OUTPATIENT
Start: 2025-06-17 | End: 2025-06-17 | Stop reason: HOSPADM

## 2025-06-17 RX ORDER — MEPERIDINE HYDROCHLORIDE 25 MG/ML
12.5 INJECTION INTRAMUSCULAR; INTRAVENOUS; SUBCUTANEOUS EVERY 10 MIN PRN
Status: DISCONTINUED | OUTPATIENT
Start: 2025-06-17 | End: 2025-06-17 | Stop reason: HOSPADM

## 2025-06-17 RX ORDER — LIDOCAINE HYDROCHLORIDE 20 MG/ML
INJECTION, SOLUTION INFILTRATION; PERINEURAL AS NEEDED
Status: DISCONTINUED | OUTPATIENT
Start: 2025-06-17 | End: 2025-06-17

## 2025-06-17 RX ORDER — DICLOFENAC SODIUM 10 MG/G
4 GEL TOPICAL 2 TIMES DAILY PRN
COMMUNITY

## 2025-06-17 RX ORDER — ALBUTEROL SULFATE 0.83 MG/ML
2.5 SOLUTION RESPIRATORY (INHALATION) ONCE AS NEEDED
Status: DISCONTINUED | OUTPATIENT
Start: 2025-06-17 | End: 2025-06-17 | Stop reason: HOSPADM

## 2025-06-17 RX ORDER — SODIUM CHLORIDE, SODIUM LACTATE, POTASSIUM CHLORIDE, CALCIUM CHLORIDE 600; 310; 30; 20 MG/100ML; MG/100ML; MG/100ML; MG/100ML
50 INJECTION, SOLUTION INTRAVENOUS CONTINUOUS
Status: DISCONTINUED | OUTPATIENT
Start: 2025-06-17 | End: 2025-06-17

## 2025-06-17 RX ORDER — ONDANSETRON HYDROCHLORIDE 2 MG/ML
4 INJECTION, SOLUTION INTRAVENOUS ONCE AS NEEDED
Status: DISCONTINUED | OUTPATIENT
Start: 2025-06-17 | End: 2025-06-17 | Stop reason: HOSPADM

## 2025-06-17 RX ORDER — CITALOPRAM 20 MG/1
20 TABLET ORAL DAILY
COMMUNITY

## 2025-06-17 RX ORDER — OXYCODONE HYDROCHLORIDE 5 MG/1
5 TABLET ORAL EVERY 4 HOURS PRN
Refills: 0 | Status: DISCONTINUED | OUTPATIENT
Start: 2025-06-17 | End: 2025-06-17 | Stop reason: HOSPADM

## 2025-06-17 RX ORDER — DICLOFENAC SODIUM 10 MG/G
4 GEL TOPICAL 2 TIMES DAILY PRN
Status: DISCONTINUED | OUTPATIENT
Start: 2025-06-17 | End: 2025-06-21 | Stop reason: HOSPADM

## 2025-06-17 RX ORDER — PROPOFOL 10 MG/ML
INJECTION, EMULSION INTRAVENOUS AS NEEDED
Status: DISCONTINUED | OUTPATIENT
Start: 2025-06-17 | End: 2025-06-17

## 2025-06-17 RX ORDER — PANTOPRAZOLE SODIUM 40 MG/10ML
40 INJECTION, POWDER, LYOPHILIZED, FOR SOLUTION INTRAVENOUS ONCE
Status: COMPLETED | OUTPATIENT
Start: 2025-06-17 | End: 2025-06-17

## 2025-06-17 RX ORDER — TRAMADOL HYDROCHLORIDE 50 MG/1
50 TABLET, FILM COATED ORAL EVERY 6 HOURS PRN
Status: DISCONTINUED | OUTPATIENT
Start: 2025-06-17 | End: 2025-06-21 | Stop reason: HOSPADM

## 2025-06-17 RX ORDER — ASCORBIC ACID 125 MG
10 TABLET,CHEWABLE ORAL NIGHTLY
COMMUNITY

## 2025-06-17 RX ORDER — ATORVASTATIN CALCIUM 20 MG/1
20 TABLET, FILM COATED ORAL NIGHTLY
COMMUNITY

## 2025-06-17 RX ORDER — DIPHENHYDRAMINE HYDROCHLORIDE 50 MG/ML
12.5 INJECTION, SOLUTION INTRAMUSCULAR; INTRAVENOUS ONCE AS NEEDED
Status: DISCONTINUED | OUTPATIENT
Start: 2025-06-17 | End: 2025-06-17 | Stop reason: HOSPADM

## 2025-06-17 RX ORDER — POLYETHYLENE GLYCOL 3350 17 G/17G
17 POWDER, FOR SOLUTION ORAL 2 TIMES DAILY
Status: DISCONTINUED | OUTPATIENT
Start: 2025-06-17 | End: 2025-06-21 | Stop reason: HOSPADM

## 2025-06-17 RX ADMIN — PROPOFOL 10 MG: 10 INJECTION, EMULSION INTRAVENOUS at 13:31

## 2025-06-17 RX ADMIN — SODIUM CHLORIDE, POTASSIUM CHLORIDE, SODIUM LACTATE AND CALCIUM CHLORIDE: 600; 310; 30; 20 INJECTION, SOLUTION INTRAVENOUS at 13:14

## 2025-06-17 RX ADMIN — Medication 8 MCG: at 13:23

## 2025-06-17 RX ADMIN — DONEPEZIL HYDROCHLORIDE 5 MG: 5 TABLET, FILM COATED ORAL at 21:10

## 2025-06-17 RX ADMIN — SODIUM CHLORIDE, SODIUM LACTATE, POTASSIUM CHLORIDE, AND CALCIUM CHLORIDE 50 ML/HR: 600; 310; 30; 20 INJECTION, SOLUTION INTRAVENOUS at 12:13

## 2025-06-17 RX ADMIN — TRAMADOL HYDROCHLORIDE 50 MG: 50 TABLET, COATED ORAL at 21:11

## 2025-06-17 RX ADMIN — PIPERACILLIN SODIUM AND TAZOBACTAM SODIUM 4.5 G: 4; .5 INJECTION, SOLUTION INTRAVENOUS at 17:51

## 2025-06-17 RX ADMIN — ACETAMINOPHEN 650 MG: 325 TABLET ORAL at 21:11

## 2025-06-17 RX ADMIN — PROPOFOL 30 MG: 10 INJECTION, EMULSION INTRAVENOUS at 13:20

## 2025-06-17 RX ADMIN — ACETAMINOPHEN 650 MG: 325 TABLET ORAL at 12:24

## 2025-06-17 RX ADMIN — POLYETHYLENE GLYCOL 3350 17 G: 17 POWDER, FOR SOLUTION ORAL at 21:12

## 2025-06-17 RX ADMIN — PROPOFOL 10 MG: 10 INJECTION, EMULSION INTRAVENOUS at 13:29

## 2025-06-17 RX ADMIN — HYDROMORPHONE HYDROCHLORIDE 0.5 MG: 0.5 INJECTION, SOLUTION INTRAMUSCULAR; INTRAVENOUS; SUBCUTANEOUS at 14:09

## 2025-06-17 RX ADMIN — LIDOCAINE 4% 1 PATCH: 40 PATCH TOPICAL at 08:39

## 2025-06-17 RX ADMIN — HYDROMORPHONE HYDROCHLORIDE 0.5 MG: 0.5 INJECTION, SOLUTION INTRAMUSCULAR; INTRAVENOUS; SUBCUTANEOUS at 14:01

## 2025-06-17 RX ADMIN — HYDROCODONE BITARTRATE AND ACETAMINOPHEN 1 TABLET: 5; 325 TABLET ORAL at 11:00

## 2025-06-17 RX ADMIN — PIPERACILLIN SODIUM AND TAZOBACTAM SODIUM 4.5 G: 4; .5 INJECTION, SOLUTION INTRAVENOUS at 00:11

## 2025-06-17 RX ADMIN — FENTANYL CITRATE 50 MCG: 0.05 INJECTION, SOLUTION INTRAMUSCULAR; INTRAVENOUS at 13:18

## 2025-06-17 RX ADMIN — HEPARIN SODIUM 5000 UNITS: 5000 INJECTION, SOLUTION INTRAVENOUS; SUBCUTANEOUS at 01:57

## 2025-06-17 RX ADMIN — PROPOFOL 10 MG: 10 INJECTION, EMULSION INTRAVENOUS at 13:33

## 2025-06-17 RX ADMIN — PROPOFOL 10 MG: 10 INJECTION, EMULSION INTRAVENOUS at 13:34

## 2025-06-17 RX ADMIN — HEPARIN SODIUM 5000 UNITS: 5000 INJECTION, SOLUTION INTRAVENOUS; SUBCUTANEOUS at 17:51

## 2025-06-17 RX ADMIN — LIDOCAINE HYDROCHLORIDE 60 MG: 20 INJECTION, SOLUTION INFILTRATION; PERINEURAL at 13:20

## 2025-06-17 RX ADMIN — LEVOTHYROXINE SODIUM 150 MCG: 150 TABLET ORAL at 06:25

## 2025-06-17 RX ADMIN — PROPOFOL 10 MG: 10 INJECTION, EMULSION INTRAVENOUS at 13:27

## 2025-06-17 RX ADMIN — FERROUS SULFATE TAB 325 MG (65 MG ELEMENTAL FE) 1 TABLET: 325 (65 FE) TAB at 08:39

## 2025-06-17 RX ADMIN — PIPERACILLIN SODIUM AND TAZOBACTAM SODIUM 4.5 G: 4; .5 INJECTION, SOLUTION INTRAVENOUS at 11:47

## 2025-06-17 RX ADMIN — PIPERACILLIN SODIUM AND TAZOBACTAM SODIUM 4.5 G: 4; .5 INJECTION, SOLUTION INTRAVENOUS at 06:25

## 2025-06-17 RX ADMIN — Medication 8 MCG: at 13:14

## 2025-06-17 RX ADMIN — OXYBUTYNIN CHLORIDE 2.5 MG: 5 TABLET ORAL at 08:39

## 2025-06-17 RX ADMIN — MINERAL OIL 1 ENEMA: 100 ENEMA RECTAL at 17:51

## 2025-06-17 RX ADMIN — HYDROMORPHONE HYDROCHLORIDE 0.5 MG: 0.5 INJECTION, SOLUTION INTRAMUSCULAR; INTRAVENOUS; SUBCUTANEOUS at 14:36

## 2025-06-17 RX ADMIN — DEXAMETHASONE SODIUM PHOSPHATE 4 MG: 4 INJECTION, SOLUTION INTRAMUSCULAR; INTRAVENOUS at 13:25

## 2025-06-17 RX ADMIN — ONDANSETRON HYDROCHLORIDE 4 MG: 2 INJECTION INTRAMUSCULAR; INTRAVENOUS at 13:30

## 2025-06-17 RX ADMIN — PANTOPRAZOLE SODIUM 40 MG: 40 TABLET, DELAYED RELEASE ORAL at 06:25

## 2025-06-17 RX ADMIN — ATORVASTATIN CALCIUM 20 MG: 20 TABLET, FILM COATED ORAL at 21:10

## 2025-06-17 RX ADMIN — OXYBUTYNIN CHLORIDE 2.5 MG: 5 TABLET ORAL at 21:15

## 2025-06-17 RX ADMIN — PANTOPRAZOLE SODIUM 40 MG: 40 INJECTION, POWDER, FOR SOLUTION INTRAVENOUS at 16:03

## 2025-06-17 SDOH — HEALTH STABILITY: MENTAL HEALTH: CURRENT SMOKER: 0

## 2025-06-17 ASSESSMENT — PAIN - FUNCTIONAL ASSESSMENT
PAIN_FUNCTIONAL_ASSESSMENT: 0-10
PAIN_FUNCTIONAL_ASSESSMENT: CPOT (CRITICAL CARE PAIN OBSERVATION TOOL)
PAIN_FUNCTIONAL_ASSESSMENT: 0-10

## 2025-06-17 ASSESSMENT — PAIN DESCRIPTION - ORIENTATION
ORIENTATION: LEFT;UPPER;ANTERIOR
ORIENTATION: LEFT;UPPER

## 2025-06-17 ASSESSMENT — PAIN SCALES - PAIN ASSESSMENT IN ADVANCED DEMENTIA (PAINAD)
BODYLANGUAGE: TENSE, DISTRESSED PACING, FIDGETING
FACIALEXPRESSION: SMILING OR INEXPRESSIVE
TOTALSCORE: 0
BREATHING: NORMAL
CONSOLABILITY: DISTRACTED OR REASSURED BY VOICE/TOUCH
NEGVOCALIZATION: REPEATED TROUBLED CALLING OUT, LOUD MOANING/GROANING, CRYING
FACIALEXPRESSION: SAD, FRIGHTENED, FROWN
CONSOLABILITY: NO NEED TO CONSOLE
TOTALSCORE: 5
BREATHING: NORMAL
BODYLANGUAGE: RELAXED

## 2025-06-17 ASSESSMENT — PAIN SCALES - GENERAL
PAINLEVEL_OUTOF10: 3
PAIN_LEVEL: 0
PAINLEVEL_OUTOF10: 10 - WORST POSSIBLE PAIN
PAINLEVEL_OUTOF10: 7
PAINLEVEL_OUTOF10: 3
PAINLEVEL_OUTOF10: 3
PAINLEVEL_OUTOF10: 7
PAINLEVEL_OUTOF10: 10 - WORST POSSIBLE PAIN
PAINLEVEL_OUTOF10: 0 - NO PAIN
PAINLEVEL_OUTOF10: 0 - NO PAIN
PAINLEVEL_OUTOF10: 10 - WORST POSSIBLE PAIN

## 2025-06-17 ASSESSMENT — PAIN DESCRIPTION - DESCRIPTORS
DESCRIPTORS: CRAMPING
DESCRIPTORS: OTHER (COMMENT);PATIENT UNABLE TO DESCRIBE
DESCRIPTORS: ACHING
DESCRIPTORS: ACHING
DESCRIPTORS: SHARP;STABBING
DESCRIPTORS: PATIENT UNABLE TO DESCRIBE

## 2025-06-17 ASSESSMENT — PAIN DESCRIPTION - LOCATION
LOCATION: ABDOMEN
LOCATION: ABDOMEN
LOCATION: LEG

## 2025-06-17 NOTE — PROGRESS NOTES
Pati Frederick is a 79 y.o. female on day 2 of admission presenting with Cellulitis of right lower extremity.    Subjective   Patient seen and examined.  Resting in bed in no acute distress.  Awake alert oriented x 2-3.  Forgetful.  Complains of left hip pain.  No other complaints.      Objective     Physical Exam  Vitals and nursing note reviewed.   Constitutional:       General: She is not in acute distress.     Appearance: Normal appearance. She is normal weight. She is not ill-appearing, toxic-appearing or diaphoretic.   HENT:      Head: Normocephalic and atraumatic.      Right Ear: External ear normal.      Left Ear: External ear normal.      Nose: Nose normal.      Mouth/Throat:      Mouth: Mucous membranes are moist.      Pharynx: Oropharynx is clear.   Eyes:      Extraocular Movements: Extraocular movements intact.      Conjunctiva/sclera: Conjunctivae normal.      Pupils: Pupils are equal, round, and reactive to light.   Cardiovascular:      Rate and Rhythm: Normal rate and regular rhythm.      Pulses: Normal pulses.      Heart sounds: Normal heart sounds. No murmur heard.  Pulmonary:      Effort: Pulmonary effort is normal. No respiratory distress.      Breath sounds: Normal breath sounds. No wheezing, rhonchi or rales.      Comments: Room air.  Abdominal:      General: Bowel sounds are normal. There is no distension.      Palpations: Abdomen is soft.      Tenderness: There is no abdominal tenderness. There is no guarding.   Genitourinary:     Comments: Deferred.  Musculoskeletal:         General: Swelling present. Normal range of motion.      Cervical back: Normal range of motion and neck supple.      Right lower leg: Edema present.      Comments: Right lower extremity dressing clean dry intact.  No blood.   Skin:     General: Skin is warm and dry.      Capillary Refill: Capillary refill takes less than 2 seconds.      Comments: Right lower extremity dressing clean dry intact.  No blood.   Neurological:  "     General: No focal deficit present.      Mental Status: She is alert and oriented to person, place, and time.   Psychiatric:         Mood and Affect: Mood normal.         Behavior: Behavior normal.       Last Recorded Vitals  Blood pressure 137/85, pulse 82, temperature 36.4 °C (97.5 °F), temperature source Oral, resp. rate 20, height 1.651 m (5' 5\"), weight 100 kg (220 lb 7.4 oz), SpO2 99%.    Intake/Output last 3 Shifts:  I/O last 3 completed shifts:  In: 240 (2.4 mL/kg) [P.O.:240]  Out: 1850 (18.5 mL/kg) [Urine:1850 (0.5 mL/kg/hr)]  Weight: 100 kg     Telemetry normal sinus rhythm    Relevant Results  Results for orders placed or performed during the hospital encounter of 06/15/25 (from the past 24 hours)   Vancomycin   Result Value Ref Range    Vancomycin 20.8 (H) 5.0 - 20.0 ug/mL   CBC   Result Value Ref Range    WBC 8.1 4.4 - 11.3 x10*3/uL    nRBC 0.0 0.0 - 0.0 /100 WBCs    RBC 3.66 (L) 4.00 - 5.20 x10*6/uL    Hemoglobin 10.4 (L) 12.0 - 16.0 g/dL    Hematocrit 33.8 (L) 36.0 - 46.0 %    MCV 92 80 - 100 fL    MCH 28.4 26.0 - 34.0 pg    MCHC 30.8 (L) 32.0 - 36.0 g/dL    RDW 14.6 (H) 11.5 - 14.5 %    Platelets 255 150 - 450 x10*3/uL   Basic Metabolic Panel   Result Value Ref Range    Glucose 97 74 - 99 mg/dL    Sodium 139 136 - 145 mmol/L    Potassium 5.0 3.5 - 5.3 mmol/L    Chloride 107 98 - 107 mmol/L    Bicarbonate 25 21 - 32 mmol/L    Anion Gap 12 10 - 20 mmol/L    Urea Nitrogen 29 (H) 6 - 23 mg/dL    Creatinine 0.99 0.50 - 1.05 mg/dL    eGFR 57 (L) >60 mL/min/1.73m*2    Calcium 8.7 8.6 - 10.3 mg/dL     No results found for the last 90 days.    CT tibia fibula right wo IV contrast  Result Date: 6/15/2025  Interpreted By:  Terri Hua, STUDY: CT of tibia and fibula without contrast.   INDICATION: Signs/Symptoms:Abscess vs hematoma to lateral edge with surrounding erythema   COMPARISON: None.   ACCESSION NUMBER(S): [id number]   ORDERING CLINICIAN: MYA BALDWIN   TECHNIQUE: Contiguous axial CT " images were obtained at  2 mm slice thickness from the level of distal femur to the level of the foot without intravenous contrast administration. Coronal and sagittal reconstructions were performed.   FINDINGS: SOFT TISSUES:   An oval-shaped soft tissue attenuation structure visualized in the lateral subcutaneous soft tissues of the mid fibula, measuring 6.0 x 2.3 x 8.8 cm in AP, transverse, and craniocaudal dimensions respectively. This has average attenuation of approximately 50-55 Hounsfield units. In the posterior aspect of this, there is a soft tissue defect noted. There is subcutaneous fluid infiltration of the posterior lower leg with mild skin thickening suggestive of cellulitis.   Muscles and tendons of the pelvis, thigh, and calf/lower leg are within normal limits without significant atrophy.   BONES: No acute fracture or malalignment.   JOINTS: Joint spaces of the knee and ankle are within normal limits without significant joint space loss or joint effusion.   MISCELLANEOUS: None.       1. Oval-shaped soft tissue attenuation structure in the lateral subcutaneous soft tissues of the mid fibula with soft tissue defect in the posterior aspect with attenuation of 50-55 Hounsfield units. Findings are favored to represent hematoma based on the attenuation. However, correlate with possible draining pus in this location to rule out abscess. 2. Subcutaneous fluid infiltration of the posterior lower leg with mild skin thickening suggesting cellulitis.   Signed by: Terri Hua 6/15/2025 10:14 AM Dictation workstation:   [id number]      Scheduled medications  Scheduled Medications[1]  Continuous medications  Continuous Medications[2]  PRN medications  PRN Medications[3]    ASSESSMENT:  Right lower extremity injury / trauma  Right lower extremity cellulitis  Right lower extremity hematoma  Normocytic anemia stable  Iron deficiency  Pyuria -   Leukocytosis resolved  Toxic  encephalopathy  Dementia  Hypothyroidism  Overactive bladder  Left hip pain, chronic    PLAN:  + Chronic left hip pain.  As needed analgesics.  Monitor.  N.p.o. 2 OR for right lower extremity hematoma I&D.  Local wound care.  As needed analgesics.  H&H stable.  Monitor H&H.  IV antibiotics.  Monitor temperature and white blood cell count.  Urine culture suspected contamination.  EUN hygiene.  Monitor I's and O's.  Monitor BMP, CBC.  Telemetry normal sinus rhythm.  Monitor heart rate and blood pressure.  Reviewe and update prior to admission medications.  Continue as prescribed, as appropriate.  PT/OT.  Fall precautions.  Up with assistance only.  Bed and chair alarm at all times.  Pressure ulcer prevention measures.  Turn and reposition every 2 hours and as needed.  Heels off bed.  Offloading.  DVT prophylaxis.  Heparin subcutaneous, on hold.  Resume post-operatively, per general surgery.  GI prophylaxis.  Supportive care.  Patient reassured.  Case management following for discharge planning.      ADDENDUM:  Per general surgery, atrial flutter on 12 lead EKG in pre-op.  On review, history of atrial fibrillation, history of amiodarone and eliquis.  Follow-up.    Post-operatively, page from PACU, patient with complaints of chest pain, reported multiple complaints.  On examination, resting on cart in mild distress reporting complaints of chest pain, back pain, shortness of breath and abdominal pain upper pointing to the upper epigastric - right upper abdomen.  Troponin and 12 lead EKG ordered.  On examination, + epigastric, right upper abdominal tenderness.  Allergies reviewed, allergy to IV contrast dye: hives.  Spoke with daughter over the phone, confirmed.  Discussed with general surgery, CT abdomen pelvis without contrast.  Orders placed.  Discussed with cardiology team, Dr. Truong in PACU.  Follow-up troponin.  Consultations input is appreciated.      Troponin 12.  Monitor.     Discussed with floor nursing, NPO  pending CT results.    CT abdomen pelvis without contrast radiology report reviewed.  Secure message with general surgery, Dr. Loredo.  No acute findings to cause pain.  Bowel regimen.  Minimal oil enema.  Increase MiraLAX to twice daily.  Avoid constipation.  Diet as tolerated.  Discussed with nursing.  Discussed with Dr. Rich.      Sofiya Marquez, APRN-CNP         [1] donepezil, 5 mg, oral, Nightly  ferrous sulfate, 1 tablet, oral, Daily with breakfast  heparin (porcine), 5,000 Units, subcutaneous, q8h  levothyroxine, 150 mcg, oral, Daily  lidocaine, 1 patch, transdermal, Daily  oxyBUTYnin, 2.5 mg, oral, BID  pantoprazole, 40 mg, oral, Daily before breakfast  piperacillin-tazobactam, 4.5 g, intravenous, q6h  polyethylene glycol, 17 g, oral, Daily  vancomycin, 1,250 mg, intravenous, q24h  [2]    [3] PRN medications: acetaminophen **OR** acetaminophen **OR** acetaminophen, benzocaine-menthol, dextromethorphan-guaifenesin, guaiFENesin, HYDROcodone-acetaminophen, hydrOXYzine HCL, melatonin, ondansetron **OR** ondansetron, polyethylene glycol, vancomycin

## 2025-06-17 NOTE — NURSING NOTE
BSSC given and patient is in bed awake and has not results from enema. Call light ans possessions within reach.

## 2025-06-17 NOTE — SIGNIFICANT EVENT
Dr Heredia notified that pt is restless and c/o severe epigastric and back pain. Crying. Dr Heredia referring care to the hospitalist stating that these complaints were not caused by anesthesia nor the surgery.

## 2025-06-17 NOTE — PROGRESS NOTES
Vancomycin Dosing by Pharmacy- FOLLOW UP    Pati Frederick is a 79 y.o. year old female who Pharmacy has been consulted for vancomycin dosing for cellulitis, skin and soft tissue. Based on the patient's indication and renal status this patient is being dosed based on a goal AUC of 400-600.     Renal function is currently stable.    Current vancomycin dose: 1250 mg given every 24 hours    Estimated vancomycin AUC on current dose: 537 mg/L.hr     Visit Vitals  /85 (BP Location: Left arm, Patient Position: Lying)   Pulse 82   Temp 36.4 °C (97.5 °F) (Oral)   Resp 20        Lab Results   Component Value Date    CREATININE 0.99 2025    CREATININE 0.87 2025    CREATININE 0.90 06/15/2025    CREATININE 1.11 (H) 2025        Patient weight is as follows:   Vitals:    06/15/25 0714   Weight: 100 kg (220 lb 7.4 oz)       Cultures:  No results found for the encounter in last 14 days.       I/O last 3 completed shifts:  In: 240 (2.4 mL/kg) [P.O.:240]  Out: 1850 (18.5 mL/kg) [Urine:1850 (0.5 mL/kg/hr)]  Weight: 100 kg   I/O during current shift:  No intake/output data recorded.    Temp (24hrs), Av.5 °C (97.7 °F), Min:36.4 °C (97.5 °F), Max:36.8 °C (98.2 °F)      Assessment/Plan    Within goal AUC range. Continue current vancomycin regimen.    This dosing regimen is predicted by [employer] to result in the following pharmacokinetic parameters:  Loading dose: N/A  Regimen: 1250 mg IV every 24 hours.  Start time: 20:12 on 2025  Exposure target: AUC24 (range) 400-600 mg/L.hr   [id number]: 525 mg/L.hr  AUC24,ss: 537 mg/L.hr  Probability of AUC24 > 400: 98 %  Ctrough,ss: 15.5 mg/L  Probability of Ctrough,ss > 20: 13 %      The next level will be obtained on  at 5:00. May be obtained sooner if clinically indicated.   Will continue to monitor renal function daily while on vancomycin and order serum creatinine at least every 48 hours if not already ordered.  Follow for continued vancomycin needs, clinical  response, and signs/symptoms of toxicity.       Duarte Lai, [facility]

## 2025-06-17 NOTE — PROGRESS NOTES
Patient not medically clear. Patient having debridement today. Patient's daughter declining SNF and wants her home at discharge. Patient will resume with [facility] aide once home. Patient my benefit from home PT and OT also. Patient would need an external [facility] referral. Will follow.      06/17/25 2089   Discharge Planning   Home or Post Acute Services In home services   Type of Home Care Services Home health aide   Expected Discharge Disposition Home Health  ([place])   Does the patient need discharge transport arranged? Yes   RoundTrip coordination needed? Yes

## 2025-06-17 NOTE — ANESTHESIA PREPROCEDURE EVALUATION
Patient: Pati Frederick    Procedure Information       Date/Time: 06/17/25 1245    Procedure: DEBRIDEMENT, WOUND, LOWER EXTREMITY (Right)    Location: [facility] / [facility]    Surgeons: Elba Loredo MD            Relevant Problems   Anesthesia (within normal limits)      Cardiac  Patient on monitor in Preop.  HR looks like flutter.  EKG ordered and atrial flutter confirmed.  She is rate controlled.  Case is not elective.  Will proceed.   (+) Atrial flutter (Multi)      Pulmonary (within normal limits)      Neuro   (+) Dementia      GI (within normal limits)      /Renal (within normal limits)      Liver (within normal limits)      Endocrine   (+) Hypothyroidism      Hematology (within normal limits)      Musculoskeletal (within normal limits)      HEENT (within normal limits)      ID (within normal limits)      Skin (within normal limits)      GYN (within normal limits)       Clinical information reviewed:    Allergies  Meds  Problems  Med Hx  Surg Hx   Fam Hx  Soc Hx      Vitals:    06/17/25 0744   BP: 137/85   Pulse: 82   Resp: 20   Temp: 36.4 °C (97.5 °F)   SpO2: 99%       Surgical History[1]  Medical History[2]  Current Medications[3]  Prior to Admission medications    Medication Sig Start Date End Date Taking? Authorizing Provider   acetaminophen (Tylenol) 500 mg tablet Take 1 tablet (500 mg) by mouth 3 times a day.    Historical Provider, MD   donepezil (Aricept) 5 mg tablet Take 1 tablet (5 mg) by mouth once daily at bedtime.    Historical Provider, MD   ferrous sulfate 325 mg (65 mg elemental) tablet Take 1 tablet by mouth once daily with breakfast.    Historical Provider, MD   hydrOXYzine pamoate (Vistaril) 25 mg capsule Take 1 capsule (25 mg) by mouth 3 times a day as needed for itching.    Historical Provider, MD   levothyroxine (Synthroid, Levoxyl) 150 mcg tablet Take 1 tablet (150 mcg) by mouth early in the morning.. Take on an empty stomach at the same time each day, either 30 to 60 minutes  "prior to breakfast    Historical Provider, MD   lidocaine (Lidoderm) 5 % patch Place 1 patch on the skin once daily. Remove & discard patch within 12 hours or as directed by MD.    Historical Provider, MD   omeprazole OTC (PriLOSEC OTC) 20 mg EC tablet Take 1 tablet (20 mg) by mouth once daily in the morning. Take before meals. Do not crush, chew, or split.    Historical Provider, MD   oxyBUTYnin XL (Ditropan-XL) 5 mg 24 hr tablet Take 1 tablet (5 mg) by mouth once daily. Do not crush, chew, or split. Take at same time each day.    Historical Provider, MD   polyethylene glycol (Glycolax, Miralax) 17 gram packet Take 17 g by mouth once daily.    Historical Provider, MD   sennosides (Senokot) 8.6 mg tablet Take 1 tablet (8.6 mg) by mouth once daily.    Historical Provider, MD     RX Allergies[4]  Social History     Tobacco Use    Smoking status: Never    Smokeless tobacco: Not on file   Substance Use Topics    Alcohol use: Not on file         Chemistry    Lab Results   Component Value Date/Time     06/17/2025 0708    K 5.0 06/17/2025 0708     06/17/2025 0708    CO2 25 06/17/2025 0708    BUN 29 (H) 06/17/2025 0708    CREATININE 0.99 06/17/2025 0708    Lab Results   Component Value Date/Time    CALCIUM 8.7 06/17/2025 0708    ALKPHOS 64 06/16/2025 0427    AST 12 06/16/2025 0427    ALT 9 06/16/2025 0427    BILITOT 0.5 06/16/2025 0427          Lab Results   Component Value Date/Time    WBC 8.1 06/17/2025 0708    HGB 10.4 (L) 06/17/2025 0708    HCT 33.8 (L) 06/17/2025 0708     06/17/2025 0708     No results found for: \"PROTIME\", \"PTT\", \"INR\"  Encounter Date: 06/15/25   ECG 12 lead   Result Value    Ventricular Rate 122    Atrial Rate 122    CA Interval 208    QRS Duration 80    QT Interval 306    QTC Calculation(Bazett) 436    R Axis 7    T Axis 55    QRS Count 20    Q Onset 220    T Offset 373    QTC Fredericia 387    Narrative    Sinus tachycardia  Otherwise normal ECG  No previous ECGs available "       NPO Detail:  No data recorded     Physical Exam    Airway  Mallampati: II  TM distance: >3 FB  Neck ROM: full  Mouth opening: 3 or more finger widths     Cardiovascular    Dental    Pulmonary    Abdominal            Anesthesia Plan    History of general anesthesia?: yes  History of complications of general anesthesia?: no    ASA 3     MAC     The patient is not a current smoker.  Patient was not previously instructed to abstain from smoking on day of procedure.  Patient did not smoke on day of procedure.  Education provided regarding risk of obstructive sleep apnea.  intravenous induction   Anesthetic plan and risks discussed with patient.    Plan discussed with CRNA and CAA.           [1]   Past Surgical History:  Procedure Laterality Date    COLECTOMY  11/12/2015    Partial Colectomy    HERNIA REPAIR  11/12/2015    Hernia Repair   [2]   Past Medical History:  Diagnosis Date    Personal history of other malignant neoplasm of large intestine 11/12/2015    History of malignant neoplasm of colon   [3]   Current Facility-Administered Medications:     acetaminophen (Tylenol) tablet 650 mg, 650 mg, oral, q4h PRN, 650 mg at 06/17/25 1224 **OR** acetaminophen (Tylenol) oral liquid 650 mg, 650 mg, oral, q4h PRN **OR** acetaminophen (Tylenol) suppository 650 mg, 650 mg, rectal, q4h PRN, Don Rich MD    benzocaine-menthol (Cepastat Sore Throat) lozenge 1 lozenge, 1 lozenge, Mouth/Throat, q2h PRN, Don Rich MD    dextromethorphan-guaifenesin (Robitussin DM)  mg/5 mL oral liquid 5 mL, 5 mL, oral, q4h PRN, Don Rich MD    donepezil (Aricept) tablet 5 mg, 5 mg, oral, Nightly, Don Rich MD, 5 mg at 06/16/25 2012    ferrous sulfate 325 mg (65 mg elemental) tablet 1 tablet, 1 tablet, oral, Daily with breakfast, Don Rich MD, 1 tablet at 06/17/25 0839    guaiFENesin (Mucinex) 12 hr tablet 600 mg, 600 mg, oral, q12h PRN, Don Rich MD    heparin (porcine) injection  5,000 Units, 5,000 Units, subcutaneous, q8h, Don Rich MD, 5,000 Units at 06/17/25 0157    HYDROcodone-acetaminophen (Norco) 5-325 mg per tablet 1 tablet, 1 tablet, oral, q6h PRN, Don Rich MD, 1 tablet at 06/17/25 1100    hydrOXYzine HCL (Atarax) tablet 25 mg, 25 mg, oral, q8h PRN, Don Rich MD    lactated Ringer's infusion, 50 mL/hr, intravenous, Continuous, Jose Jenkins PA-C, Last Rate: 50 mL/hr at 06/17/25 1213, 50 mL/hr at 06/17/25 1213    levothyroxine (Synthroid, Levoxyl) tablet 150 mcg, 150 mcg, oral, Daily, Don Rich MD, 150 mcg at 06/17/25 0625    lidocaine 4 % patch 1 patch, 1 patch, transdermal, Daily, Don Rich MD, 1 patch at 06/17/25 0839    melatonin tablet 6 mg, 6 mg, oral, Nightly PRN, Don Rich MD, 6 mg at 06/15/25 2259    ondansetron (Zofran) tablet 4 mg, 4 mg, oral, q8h PRN **OR** ondansetron (Zofran) injection 4 mg, 4 mg, intravenous, q8h PRN, Don Rich MD    oxyBUTYnin (Ditropan) tablet 2.5 mg, 2.5 mg, oral, BID, Don Rich MD, 2.5 mg at 06/17/25 0839    pantoprazole (ProtoNix) EC tablet 40 mg, 40 mg, oral, Daily before breakfast, Don Rich MD, 40 mg at 06/17/25 0625    piperacillin-tazobactam (Zosyn) 4.5 g in dextrose (iso)  mL, 4.5 g, intravenous, q6h, Don Rich MD, Stopped at 06/17/25 1217    polyethylene glycol (Glycolax, Miralax) packet 17 g, 17 g, oral, Daily PRN, Don Rich MD    polyethylene glycol (Glycolax, Miralax) packet 17 g, 17 g, oral, Daily, Don Rich MD, 17 g at 06/16/25 0824    vancomycin (Vancocin) pharmacy to dose - pharmacy monitoring, , miscellaneous, Daily PRN, Don Rich MD    vancomycin (Xellia) 1,250 mg in diluent combination  mL, 1,250 mg, intravenous, q24h, Don Rich MD, Stopped at 06/16/25 0261  [4]   Allergies  Allergen Reactions    Bactrim [Sulfamethoxazole-Trimethoprim] GI Upset    Cefepime GI Upset    Clindamycin  Hives    Iodinated Contrast Media Hives    Levaquin [Levofloxacin] Confusion    Lunesta [Eszopiclone] Angioedema

## 2025-06-17 NOTE — PROGRESS NOTES
Occupational Therapy                 Therapy Communication Note    Patient Name: Pati Frederick  MRN: [id number]  Department: [address]  Room: [facility]  Today's Date: 6/17/2025     Discipline: Occupational Therapy    Missed Visit: OT Missed Visit: Yes     Missed Visit Reason: Missed Visit Reason: Cancel (pt planned for debridement in OR today. will hold at this time)    Missed Time: Attempt    Comment:

## 2025-06-17 NOTE — CARE PLAN
The patient's goals for the shift include Iv antibiotics    The clinical goals for the shift include Comfort and safety.    Over the shift, the patient did not make progress toward the following goals. Barriers to progression include . Recommendations to address these barriers include .    Problem: Safety - Adult  Goal: Free from fall injury  Outcome: Progressing     Problem: Skin  Goal: Promote skin healing  Outcome: Progressing

## 2025-06-17 NOTE — CONSULTS
Inpatient consult to Cardiology  Consult performed by: Jose Truong DO  Consult ordered by: ZAHEER Rodríguez-CNP  Reason for consult: Epigastric pain and atrial flutter        History Of Present Illness:    Pati Frederick is a 79 y.o. female presenting with right lower extremity welling and edema.  This patient has a history of mild dementia and is unable to provide an accurate history.  She reportedly had an injury to her right lower extremity approximately a week ago developing swelling and edema.  Radiographic imaging revealed a hematoma and she was taken to the operating room today for an evacuation procedure.  In the PACU she developed epigastric pain and an EKG was done revealing atrial flutter with controlled heart rate response.  When asked the patient if she has had any cardiac problems in the past she states she does not think so.  The record suggest that she was on amiodarone and Eliquis at 1 point in time.  The patient still has significant pain in the epigastrium and back with significant tenderness to palpation in the right upper quadrant.  EKG does reveal atrial flutter but no ischemic ST segment changes.    Last Recorded Vitals:  Vitals:    06/17/25 1400 06/17/25 1415 06/17/25 1430 06/17/25 1445   BP: 133/90 133/61 (!) 124/100 107/58   BP Location:    Left arm   Patient Position:    Lying   Pulse: 81 80 104 95   Resp: 18 19 19 22   Temp:       TempSrc:       SpO2: 96% 96% 93% 94%   Weight:       Height:           Last Labs:  CBC - 6/17/2025:  7:08 AM  8.1 10.4 255    33.8      CMP - 6/17/2025:  7:08 AM  8.7 5.7 17 --- 0.3   _ 2.9 10 58      PTT - No results in last year.  _   _ _     Troponin I, High Sensitivity   Date/Time Value Ref Range Status   06/15/2025 07:34 AM 13 0 - 13 ng/L Final     Hemoglobin A1C   Date/Time Value Ref Range Status   02/10/2025 03:50 PM 5.9 (H) 4.3 - 5.6 % Final     Comment:     American Diabetes Association guidelines indicate that patients with HgbA1c in the range  "5.7-6.4% are at increased risk for development of diabetes, and intervention by lifestyle modification may be beneficial. HgbA1c greater or equal to 6.5% is considered diagnostic of diabetes.   08/19/2024 04:00 PM 5.9 (H) 4.3 - 5.6 % Final     Comment:     American Diabetes Association guidelines indicate that patients with HgbA1c in the range 5.7-6.4% are at increased risk for development of diabetes, and intervention by lifestyle modification may be beneficial. HgbA1c greater or equal to 6.5% is considered diagnostic of diabetes.      Last I/O:  I/O last 3 completed shifts:  In: 240 (2.4 mL/kg) [P.O.:240]  Out: 1850 (18.5 mL/kg) [Urine:1850 (0.5 mL/kg/hr)]  Weight: 100 kg     Past Cardiology Tests (Last 3 Years):  EKG:  Electrocardiogram, 12-lead PRN ACS symptoms 06/17/2025 (Preliminary)      ECG 12 lead 06/15/2025    Echo:  No results found for this or any previous visit from the past 1095 days.    Ejection Fractions:  No results found for: \"EF\"  Cath:  No results found for this or any previous visit from the past 1095 days.    Stress Test:  No results found for this or any previous visit from the past 1095 days.    Cardiac Imaging:  No results found for this or any previous visit from the past 1095 days.      Past Medical History:  She has a past medical history of Personal history of other malignant neoplasm of large intestine (11/12/2015).    Past Surgical History:  She has a past surgical history that includes Colectomy (11/12/2015) and Hernia repair (11/12/2015).      Social History:  She reports that she has never smoked. She does not have any smokeless tobacco history on file. No history on file for alcohol use and drug use.  The patient tells me she stopped cigarette smoking about a year ago.    Family History:  Family History[1]     Allergies:  Bactrim [sulfamethoxazole-trimethoprim], Cefepime, Clindamycin, Iodinated contrast media, Levaquin [levofloxacin], and Lunesta [eszopiclone]    Inpatient " Medications:  Scheduled Medications[2]  PRN Medications[3]  Continuous Medications[4]  Outpatient Medications:  Current Outpatient Medications   Medication Instructions    acetaminophen (TYLENOL) 500 mg, oral, 3 times daily    donepezil (ARICEPT) 5 mg, oral, Nightly    ferrous sulfate 325 mg (65 mg elemental) tablet 325 mg of ferrous sulfate, oral, Daily with breakfast    hydrOXYzine pamoate (VISTARIL) 25 mg, oral, 3 times daily PRN    levothyroxine (SYNTHROID, LEVOXYL) 150 mcg, oral, Daily, Take on an empty stomach at the same time each day, either 30 to 60 minutes prior to breakfast    lidocaine (Lidoderm) 5 % patch 1 patch, transdermal, Daily, Remove & discard patch within 12 hours or as directed by MD.    omeprazole OTC (PRILOSEC OTC) 20 mg, oral, Daily before breakfast, Do not crush, chew, or split.    oxyBUTYnin XL (DITROPAN-XL) 5 mg, oral, Daily, Do not crush, chew, or split. Take at same time each day.    polyethylene glycol (GLYCOLAX, MIRALAX) 17 g, oral, Daily    sennosides (Senokot) 8.6 mg tablet 1 tablet, oral, Daily       Physical Exam:  General: Awake and moderate distress from epigastric and back pain  Head: Normocephalic  Eyes: No scleral icterus  Neck: Normal jugular venous pressures  Heart: Regular rate and rhythm  Lungs: Clear anteriorly  Abdomen: Significant right upper quadrant tenderness to palpation.  Extremities: No significant edema  Neuro: Awake and alert answers simple questions appropriately but does become somewhat confused on more complex questioning       Assessment/Plan     Epigastric/back pain  Right upper quadrant tenderness on palpation  Atrial flutter with controlled heart rate response  Status post extraction hematoma right lower extremity 6/17/2025  History of colon cancer  Hypothyroidism    6/17: Patient now in atrial flutter but with a controlled heart rate response.  Etiology of the epigastric pain and back pain not clear.  No ischemic changes on the EKG but will obtain  troponin levels.  With right upper quadrant tenderness to palpation a CT scan of the abdomen has been ordered will await results.  [id number] score of 3 thus anticoagulation would be recommended but will not at this point in time given the right lower extremity hematoma.  Again records suggest she was on amiodarone and Eliquis at some point in time though she is not able to confirm.  Will continue to monitor cardiac rhythm on telemetry and follow with you.    Peripheral IV 06/15/25 20 G Posterior;Right Forearm (Active)   Site Assessment Clean;Dry;Intact 06/17/25 1423   Line Status Infusing;Leaking 06/17/25 1423   Dressing Status Clean;Dry;Occlusive 06/17/25 1423   Number of days: 2       Peripheral IV 06/17/25 20 G Left;Posterior Hand (Active)   Site Assessment Clean;Dry;Intact 06/17/25 1424   Line Status Infusing 06/17/25 1424   Dressing Status Clean;Dry;Occlusive 06/17/25 1424   Number of days: 0       Code Status:  Full Code    I spent 60 minutes in the professional and overall care of this patient.        Jose Truong DO       [1] No family history on file.  [2]   Scheduled medications   Medication Dose Route Frequency    donepezil  5 mg oral Nightly    ferrous sulfate  1 tablet oral Daily with breakfast    heparin (porcine)  5,000 Units subcutaneous q8h    levothyroxine  150 mcg oral Daily    lidocaine  1 patch transdermal Daily    oxyBUTYnin  2.5 mg oral BID    pantoprazole  40 mg oral Daily before breakfast    pantoprazole  40 mg intravenous Once    piperacillin-tazobactam  4.5 g intravenous q6h    polyethylene glycol  17 g oral Daily    [Transfer Hold] vancomycin  1,250 mg intravenous q24h   [3]   PRN medications   Medication    acetaminophen    Or    acetaminophen    Or    acetaminophen    albuterol    benzocaine-menthol    dextromethorphan-guaifenesin    diphenhydrAMINE    guaiFENesin    hydrALAZINE    [Transfer Hold] HYDROcodone-acetaminophen    HYDROmorphone    HYDROmorphone    hydrOXYzine HCL     melatonin    meperidine    ondansetron    Or    ondansetron    ondansetron    oxyCODONE    oxygen    polyethylene glycol    vancomycin   [4]   Continuous Medications   Medication Dose Last Rate    lactated Ringer's  50 mL/hr 50 mL/hr (06/17/25 1213)

## 2025-06-17 NOTE — OP NOTE
DEBRIDEMENT, WOUND, LOWER EXTREMITY (R), EVACUATION, HEMATOMA, LOWER EXTREMITY (R) Operative Note     Date: 2025  OR Location: [facility]    Name: Pati Frederick, : 1945, Age: 79 y.o., MRN: [id number], Sex: female    Diagnosis  Pre-op Diagnosis      * Cellulitis of right lower extremity [L03.115]     * Hematoma of right lower extremity, initial encounter [S80.11XA] Post-op Diagnosis     * Cellulitis of right lower extremity [L03.115]     * Hematoma of right lower extremity, initial encounter [S80.11XA]     Procedures  DEBRIDEMENT, WOUND, LOWER EXTREMITY  55297 - GA DEBRIDEMENT SUBCUTANEOUS TISSUE 1ST 20 SQ CM/<    EVACUATION, HEMATOMA, LOWER EXTREMITY  83543 - GA INCISION & DRAINAGE LEG/ANKLE ABSCESS/HEMATOMA    11045x2  Surgeons      * Elba Loredo - Primary    Resident/Fellow/Other Assistant:  Surgeons and Role:  * No surgeons found with a matching role *    Staff:   Surgical Assistant:   Circulator: Alycia Corbinub Person: Alize    Anesthesia Staff: Anesthesiologist: Nabor Pardo MD  CRNA: ZAHEER Bates-CRNA    Procedure Summary  Anesthesia: Monitor Anesthesia Care  ASA: III  Estimated Blood Loss: 5mL  Intra-op Medications:   Administrations occurring from 1245 to 1410 on 25:   Medication Name Total Dose   dexAMETHasone (Decadron) 4 mg/mL IV Syringe 2 mL 4 mg   dexMEDETOMidine 4 mcg/mL in NS syringe 16 mcg   fentaNYL (Sublimaze) injection 50 mcg/mL 50 mcg   HYDROcodone-acetaminophen (Norco) 5-325 mg per tablet 1 tablet Cannot be calculated   LR bolus Cannot be calculated   lidocaine (Xylocaine) 2 % 60 mg   propofol (Diprivan) injection 10 mg/mL 80 mg   vancomycin (Xellia) 1,250 mg in diluent combination  mL Cannot be calculated              Anesthesia Record               Intraprocedure I/O Totals          Intake    LR bolus 400.00 mL    Total Intake 400 mL          Specimen:   ID Type Source Tests Collected by Time   A : NECROTIC SKIN HEMATOMA Tissue CLOT/THROMBUS TISSUE/WOUND  CULTURE/SMEAR lEba Loredo MD 6/17/2025 1338                 Drains and/or Catheters:   External Urinary Catheter (Active)   Output (mL) 1000 mL 06/16/25 0531         Findings: 4x3cm area of skin necrosis.  Underlying hematoma 11 x 7 cm.  Skin debrided is 9 x 5 cm.  Curlex packed on top of the wound followed by Coban    Indications: Pati Frederick is an 79 y.o. female who is having surgery for Cellulitis of right lower extremity [L03.115]  Hematoma of right lower extremity, initial encounter [S80.11XA].     The patient was seen in the preoperative area. The risks, benefits, complications, treatment options, non-operative alternatives, expected recovery and outcomes were discussed with the patient. The possibilities of reaction to medication, pulmonary aspiration, injury to surrounding structures, bleeding, recurrent infection, the need for additional procedures, failure to diagnose a condition, and creating a complication requiring transfusion or operation were discussed with the patient. The patient concurred with the proposed plan, giving informed consent.  The site of surgery was properly noted/marked if necessary per policy. The patient has been actively warmed in preoperative area. Preoperative antibiotics have been ordered and given within on scheduled hours of incision. Venous thrombosis prophylaxis have been ordered including unilateral sequential compression device    Procedure Details:   Informed consent was obtained from the patient.  I also called and talked to her daughter on the phone preoperatively.  She was brought to the operating placed in the room table in the supine position.  1 SCD was placed on the left leg and a timeout was performed.  MAC anesthesia was induced.  Her right leg was prepped and draped.  There was evidence of a large hematoma on the right lateral leg with an overlying area 4 x 3 cm of skin necrosis.  I started by grasping this and incising it and it was sent off as a specimen  for culture.  There was clotted hematoma underneath which was evacuated and the cavity was irrigated with saline.  The total hematoma cavity was 11 x 7 cm.  Additional skin needed to be debrided for a final wound measurement of 9 x 5 cm.  Hemostasis was ensured using the Bovie electrocautery.  Kerlix was packed on the wound and Coban placed over top.  Tolerated procedure well.  She was transferred back to the bed and taken to PACU in stable condition.  Evidence of Infection: Yes; hematoma  Complications:  None; patient tolerated the procedure well.    Disposition: PACU - hemodynamically stable.  Condition: stable         Elba Facundo  Phone Number: [phone]

## 2025-06-17 NOTE — PROGRESS NOTES
Physical Therapy                 Therapy Communication Note    Patient Name: Pati Frederick  MRN: [id number]  Department: [facility] OR  Room: [facility] OR  Today's Date: 6/17/2025     Discipline: Physical Therapy    Missed Visit Reason: Missed Visit Reason: Cancel (Pt transported off the floor to the OR for debridement of LLE wound.)

## 2025-06-17 NOTE — CARE PLAN
The patient's goals for the shift include Iv antibiotics    The clinical goals for the shift include maintain comfort and safety

## 2025-06-17 NOTE — ANESTHESIA POSTPROCEDURE EVALUATION
Patient: Pati Frederick    Procedure Summary       Date: 06/17/25 Room / Location: [facility] OR 10 / [facility]    Anesthesia Start: 1314 Anesthesia Stop: 1350    Procedures:       DEBRIDEMENT, WOUND, LOWER EXTREMITY (Right: Leg Lower)      EVACUATION, HEMATOMA, LOWER EXTREMITY (Right) Diagnosis:       Cellulitis of right lower extremity      Hematoma of right lower extremity, initial encounter      (Cellulitis of right lower extremity [L03.115])      (Hematoma of right lower extremity, initial encounter [S80.11XA])    Surgeons: Elba Loredo MD Responsible Provider: Nabor Pardo MD    Anesthesia Type: MAC ASA Status: 3            Anesthesia Type: MAC    Vitals Value Taken Time   /61 06/17/25 14:15   Temp 36 06/17/25 14:28   Pulse 80 06/17/25 14:15   Resp 19 06/17/25 14:15   SpO2 96 % 06/17/25 14:15       Anesthesia Post Evaluation    Patient location during evaluation: PACU  Patient participation: complete - patient participated  Level of consciousness: awake  Pain score: 0  Pain management: adequate  Multimodal analgesia pain management approach  Airway patency: patent  Two or more strategies used to mitigate risk of obstructive sleep apnea  Cardiovascular status: acceptable  Respiratory status: acceptable  Hydration status: acceptable  Postoperative Nausea and Vomiting: none  Comments: Patient is not complaining of leg pain.  She is complaining of abdominal pain.  Hospitalist called and requested to assess patient.         There were no known notable events for this encounter.

## 2025-06-18 ENCOUNTER — APPOINTMENT (OUTPATIENT)
Dept: RADIOLOGY | Facility: HOSPITAL | Age: 80
End: 2025-06-18
Payer: MEDICAID

## 2025-06-18 LAB
ANION GAP SERPL CALCULATED.3IONS-SCNC: 13 MMOL/L (ref 10–20)
BUN SERPL-MCNC: 25 MG/DL (ref 6–23)
CALCIUM SERPL-MCNC: 8.9 MG/DL (ref 8.6–10.3)
CHLORIDE SERPL-SCNC: 105 MMOL/L (ref 98–107)
CO2 SERPL-SCNC: 23 MMOL/L (ref 21–32)
CREAT SERPL-MCNC: 0.85 MG/DL (ref 0.5–1.05)
EGFRCR SERPLBLD CKD-EPI 2021: 69 ML/MIN/1.73M*2
ERYTHROCYTE [DISTWIDTH] IN BLOOD BY AUTOMATED COUNT: 14.3 % (ref 11.5–14.5)
GLUCOSE SERPL-MCNC: 125 MG/DL (ref 74–99)
HCT VFR BLD AUTO: 36.4 % (ref 36–46)
HGB BLD-MCNC: 11.5 G/DL (ref 12–16)
MCH RBC QN AUTO: 28 PG (ref 26–34)
MCHC RBC AUTO-ENTMCNC: 31.6 G/DL (ref 32–36)
MCV RBC AUTO: 89 FL (ref 80–100)
NRBC BLD-RTO: 0 /100 WBCS (ref 0–0)
PLATELET # BLD AUTO: 272 X10*3/UL (ref 150–450)
POTASSIUM SERPL-SCNC: 4.8 MMOL/L (ref 3.5–5.3)
RBC # BLD AUTO: 4.1 X10*6/UL (ref 4–5.2)
SODIUM SERPL-SCNC: 136 MMOL/L (ref 136–145)
WBC # BLD AUTO: 10 X10*3/UL (ref 4.4–11.3)

## 2025-06-18 PROCEDURE — 85027 COMPLETE CBC AUTOMATED: CPT | Performed by: NURSE PRACTITIONER

## 2025-06-18 PROCEDURE — 2500000002 HC RX 250 W HCPCS SELF ADMINISTERED DRUGS (ALT 637 FOR MEDICARE OP, ALT 636 FOR OP/ED): Performed by: INTERNAL MEDICINE

## 2025-06-18 PROCEDURE — 97165 OT EVAL LOW COMPLEX 30 MIN: CPT | Mod: GO

## 2025-06-18 PROCEDURE — 80048 BASIC METABOLIC PNL TOTAL CA: CPT | Performed by: NURSE PRACTITIONER

## 2025-06-18 PROCEDURE — 2500000004 HC RX 250 GENERAL PHARMACY W/ HCPCS (ALT 636 FOR OP/ED): Performed by: INTERNAL MEDICINE

## 2025-06-18 PROCEDURE — 97530 THERAPEUTIC ACTIVITIES: CPT | Mod: GP | Performed by: PHYSICAL THERAPIST

## 2025-06-18 PROCEDURE — 2500000001 HC RX 250 WO HCPCS SELF ADMINISTERED DRUGS (ALT 637 FOR MEDICARE OP): Performed by: NURSE PRACTITIONER

## 2025-06-18 PROCEDURE — 2500000004 HC RX 250 GENERAL PHARMACY W/ HCPCS (ALT 636 FOR OP/ED): Performed by: NURSE PRACTITIONER

## 2025-06-18 PROCEDURE — 74018 RADEX ABDOMEN 1 VIEW: CPT

## 2025-06-18 PROCEDURE — 36415 COLL VENOUS BLD VENIPUNCTURE: CPT | Performed by: NURSE PRACTITIONER

## 2025-06-18 PROCEDURE — 2500000001 HC RX 250 WO HCPCS SELF ADMINISTERED DRUGS (ALT 637 FOR MEDICARE OP): Performed by: INTERNAL MEDICINE

## 2025-06-18 PROCEDURE — 2500000005 HC RX 250 GENERAL PHARMACY W/O HCPCS: Performed by: INTERNAL MEDICINE

## 2025-06-18 PROCEDURE — 1200000002 HC GENERAL ROOM WITH TELEMETRY DAILY

## 2025-06-18 PROCEDURE — 74018 RADEX ABDOMEN 1 VIEW: CPT | Performed by: RADIOLOGY

## 2025-06-18 PROCEDURE — 99232 SBSQ HOSP IP/OBS MODERATE 35: CPT | Performed by: NURSE PRACTITIONER

## 2025-06-18 RX ORDER — VANCOMYCIN 1.75 G/350ML
1250 INJECTION, SOLUTION INTRAVENOUS EVERY 24 HOURS
Status: DISCONTINUED | OUTPATIENT
Start: 2025-06-18 | End: 2025-06-19

## 2025-06-18 RX ORDER — AMIODARONE HYDROCHLORIDE 100 MG/1
100 TABLET ORAL DAILY
Status: DISCONTINUED | OUTPATIENT
Start: 2025-06-18 | End: 2025-06-21 | Stop reason: HOSPADM

## 2025-06-18 RX ADMIN — OXYBUTYNIN CHLORIDE 2.5 MG: 5 TABLET ORAL at 20:06

## 2025-06-18 RX ADMIN — DONEPEZIL HYDROCHLORIDE 5 MG: 5 TABLET, FILM COATED ORAL at 20:06

## 2025-06-18 RX ADMIN — PIPERACILLIN SODIUM AND TAZOBACTAM SODIUM 4.5 G: 4; .5 INJECTION, SOLUTION INTRAVENOUS at 17:56

## 2025-06-18 RX ADMIN — FERROUS SULFATE TAB 325 MG (65 MG ELEMENTAL FE) 1 TABLET: 325 (65 FE) TAB at 09:17

## 2025-06-18 RX ADMIN — POLYETHYLENE GLYCOL 3350 17 G: 17 POWDER, FOR SOLUTION ORAL at 09:19

## 2025-06-18 RX ADMIN — TRAMADOL HYDROCHLORIDE 50 MG: 50 TABLET, COATED ORAL at 22:36

## 2025-06-18 RX ADMIN — PIPERACILLIN SODIUM AND TAZOBACTAM SODIUM 4.5 G: 4; .5 INJECTION, SOLUTION INTRAVENOUS at 00:18

## 2025-06-18 RX ADMIN — LEVOTHYROXINE SODIUM 150 MCG: 150 TABLET ORAL at 06:08

## 2025-06-18 RX ADMIN — LIDOCAINE 4% 1 PATCH: 40 PATCH TOPICAL at 09:18

## 2025-06-18 RX ADMIN — PIPERACILLIN SODIUM AND TAZOBACTAM SODIUM 4.5 G: 4; .5 INJECTION, SOLUTION INTRAVENOUS at 06:08

## 2025-06-18 RX ADMIN — OXYBUTYNIN CHLORIDE 2.5 MG: 5 TABLET ORAL at 09:33

## 2025-06-18 RX ADMIN — PIPERACILLIN SODIUM AND TAZOBACTAM SODIUM 4.5 G: 4; .5 INJECTION, SOLUTION INTRAVENOUS at 12:15

## 2025-06-18 RX ADMIN — ACETAMINOPHEN 650 MG: 325 TABLET ORAL at 12:14

## 2025-06-18 RX ADMIN — PANTOPRAZOLE SODIUM 40 MG: 40 TABLET, DELAYED RELEASE ORAL at 06:08

## 2025-06-18 RX ADMIN — VANCOMYCIN 1250 MG: 1.75 INJECTION, SOLUTION INTRAVENOUS at 09:18

## 2025-06-18 RX ADMIN — PIPERACILLIN SODIUM AND TAZOBACTAM SODIUM 4.5 G: 4; .5 INJECTION, SOLUTION INTRAVENOUS at 22:36

## 2025-06-18 RX ADMIN — HEPARIN SODIUM 5000 UNITS: 5000 INJECTION, SOLUTION INTRAVENOUS; SUBCUTANEOUS at 17:56

## 2025-06-18 RX ADMIN — TRAMADOL HYDROCHLORIDE 50 MG: 50 TABLET, COATED ORAL at 14:04

## 2025-06-18 RX ADMIN — HEPARIN SODIUM 5000 UNITS: 5000 INJECTION, SOLUTION INTRAVENOUS; SUBCUTANEOUS at 09:17

## 2025-06-18 RX ADMIN — ATORVASTATIN CALCIUM 20 MG: 20 TABLET, FILM COATED ORAL at 20:06

## 2025-06-18 RX ADMIN — ACETAMINOPHEN 650 MG: 325 TABLET ORAL at 18:19

## 2025-06-18 RX ADMIN — AMIODARONE HYDROCHLORIDE 100 MG: 100 TABLET ORAL at 09:17

## 2025-06-18 RX ADMIN — HEPARIN SODIUM 5000 UNITS: 5000 INJECTION, SOLUTION INTRAVENOUS; SUBCUTANEOUS at 03:00

## 2025-06-18 ASSESSMENT — COGNITIVE AND FUNCTIONAL STATUS - GENERAL
STANDING UP FROM CHAIR USING ARMS: A LOT
PERSONAL GROOMING: A LOT
WALKING IN HOSPITAL ROOM: TOTAL
EATING MEALS: A LOT
MOBILITY SCORE: 13
CLIMB 3 TO 5 STEPS WITH RAILING: TOTAL
PERSONAL GROOMING: A LITTLE
DRESSING REGULAR LOWER BODY CLOTHING: A LOT
TOILETING: A LOT
DRESSING REGULAR UPPER BODY CLOTHING: A LITTLE
WALKING IN HOSPITAL ROOM: A LOT
MOVING TO AND FROM BED TO CHAIR: A LOT
EATING MEALS: A LITTLE
DRESSING REGULAR LOWER BODY CLOTHING: TOTAL
HELP NEEDED FOR BATHING: A LOT
DAILY ACTIVITIY SCORE: 12
TOILETING: TOTAL
DAILY ACTIVITIY SCORE: 13
TURNING FROM BACK TO SIDE WHILE IN FLAT BAD: A LOT
TURNING FROM BACK TO SIDE WHILE IN FLAT BAD: TOTAL
CLIMB 3 TO 5 STEPS WITH RAILING: A LOT
MOBILITY SCORE: 7
MOVING FROM LYING ON BACK TO SITTING ON SIDE OF FLAT BED WITH BEDRAILS: A LITTLE
MOVING FROM LYING ON BACK TO SITTING ON SIDE OF FLAT BED WITH BEDRAILS: A LOT
DRESSING REGULAR UPPER BODY CLOTHING: A LOT
STANDING UP FROM CHAIR USING ARMS: TOTAL
HELP NEEDED FOR BATHING: A LOT
MOVING TO AND FROM BED TO CHAIR: TOTAL

## 2025-06-18 ASSESSMENT — PAIN SCALES - GENERAL
PAINLEVEL_OUTOF10: 0 - NO PAIN
PAINLEVEL_OUTOF10: 0 - NO PAIN
PAINLEVEL_OUTOF10: 5 - MODERATE PAIN
PAINLEVEL_OUTOF10: 9
PAINLEVEL_OUTOF10: 5 - MODERATE PAIN
PAINLEVEL_OUTOF10: 6
PAINLEVEL_OUTOF10: 0 - NO PAIN
PAINLEVEL_OUTOF10: 6
PAINLEVEL_OUTOF10: 7
PAINLEVEL_OUTOF10: 0 - NO PAIN

## 2025-06-18 ASSESSMENT — PAIN DESCRIPTION - DESCRIPTORS
DESCRIPTORS: ACHING;BURNING
DESCRIPTORS: ACHING

## 2025-06-18 ASSESSMENT — PAIN - FUNCTIONAL ASSESSMENT
PAIN_FUNCTIONAL_ASSESSMENT: 0-10

## 2025-06-18 ASSESSMENT — ACTIVITIES OF DAILY LIVING (ADL): BATHING_ASSISTANCE: MODERATE

## 2025-06-18 ASSESSMENT — PAIN DESCRIPTION - LOCATION
LOCATION: HIP
LOCATION: LEG

## 2025-06-18 ASSESSMENT — PAIN DESCRIPTION - ORIENTATION
ORIENTATION: RIGHT
ORIENTATION: LEFT

## 2025-06-18 NOTE — PROGRESS NOTES
Evaluation    Patient Name: Pati Frederick  MRN: [id number]  Department: [address]  Room: 415/415-B  Today's Date: 6/18/2025  Time Calculation  Start Time: 1311  Stop Time: 1334  Time Calculation (min): 23 min    Assessment  IP OT Assessment  OT Assessment: 78 yo female with progressively worsening redness, pain, and swelling in RLE admits from SNF with RLE cellulitis.  On eval, patient presents with significant functional deficits d/t impaired activity tolerance and profuse weakness in the setting of baseline cognitive deficits.  Pt would benefit from OT services to provide ADL retraining utilizing compensatory techniques and progressive strengthening in order to increase functional capacity.  Prognosis: Good  Barriers to Discharge Home: No anticipated barriers (to return to SNF)  Evaluation/Treatment Tolerance: Patient limited by fatigue, Patient limited by pain  Medical Staff Made Aware: Yes  End of Session Communication: Bedside nurse  End of Session Patient Position: Bed, 3 rail up, Alarm on    Plan:  Treatment Interventions: ADL retraining, Functional transfer training, UE strengthening/ROM, Endurance training, Patient/family training, Equipment evaluation/education, Compensatory technique education  OT Frequency: 3 times per week  OT Discharge Recommendations: Moderate intensity level of continued care  Equipment Recommended upon Discharge: Lift  OT Recommended Transfer Status: Assist of 2  OT - OK to Discharge: Yes      Subjective   Current Problem:  1. Cellulitis of right lower extremity  Case Request Operating Room: DEBRIDEMENT, WOUND, LOWER EXTREMITY    Case Request Operating Room: DEBRIDEMENT, WOUND, LOWER EXTREMITY    Tissue/Wound Culture/Smear    Tissue/Wound Culture/Smear      2. Hematoma of right lower extremity, initial encounter  Case Request Operating Room: DEBRIDEMENT, WOUND, LOWER EXTREMITY    Case Request Operating Room: DEBRIDEMENT, WOUND, LOWER EXTREMITY    Tissue/Wound Culture/Smear     "Tissue/Wound Culture/Smear    Referral to Wound Clinic      3. Delirium          OT Visit Info:  OT Received On: 06/18/25    General Visit Info:  General  Reason for Referral: Impaired ADLs, impaired mobility d/t RLE cellulitis  Referred By: Dr Rich  Past Medical History Relevant to Rehab: dementia, a-fib, hypothryoid, colon CA - s/p colectomy, hernia repair  Family/Caregiver Present: No  Co-Treatment: PT  Co-Treatment Reason: to maximize safety with mobility and outcome  Prior to Session Communication: Bedside nurse  Patient Position Received: Bed, 3 rail up, Alarm on  Preferred Learning Style: verbal, visual  General Comment: Cleared by nursing and agreeable for therapy    Precautions:  Hearing/Visual Limitations: hearing intact; +corrective lenses  Medical Precautions: Fall precautions     Date/Time Vitals Session Patient Position Pulse Resp SpO2 BP MAP (mmHg)    06/18/25 1311 During OT  --  125  --  --  --  --      Pain:  Pain Assessment  Pain Assessment: 0-10  0-10 (Numeric) Pain Score: 6  Pain Type: Acute pain  Pain Location: Leg (+ left hip with activity)  Pain Orientation: Right  Pain Interventions: Repositioned, Other (Comment) (Nurse aware)    Objective   Cognition:  Overall Cognitive Status: Impaired at baseline  Orientation Level: Disoriented to time, Disoriented to situation (\"June 25th\")  Following Commands: Follows one step commands with increased time  Insight: Moderate  Processing Speed: Delayed    Home Living:  Type of Home: House  Lives With: Adult children (Daughter)  Home Adaptive Equipment: Hospital bed, Wheelchair-manual, Walker rolling or standard  Home Layout: Able to live on main level with bedroom/bathroom  Entrance Stairs-Rails: Both  Entrance Stairs-Number of Steps: 5  Bathroom Shower/Tub: Tub/shower unit  Bathroom Equipment: Grab bars in shower, Tub transfer bench  Home Living Comments: Pt is a questionable historian.  Admits from SNF.     Prior Function:  Receives Help From: " Family  ADL Assistance:  (assume assist)  Homemaking Assistance: Needs assistance (likely dependent)  Ambulatory Assistance: Needs assistance    ADL:  Eating Assistance: Stand by  Eating Deficit: Setup  Grooming Assistance: Minimal  Grooming Deficit: Brushing hair  Bathing Assistance: Moderate  Bathing Deficit: Right lower leg including foot, Left lower leg including foot, Perineal area, Buttocks  UE Dressing Assistance: Minimal  UE Dressing Deficit: Pull around back  LE Dressing Assistance: Total  LE Dressing Deficit:  (donning/dofffing socks - remainder of ADLs = per clinical judgement this date)  Toileting Assistance with Device: Total  Toileting Deficit: Urinary Catheter (+purewick)    Activity Tolerance:  Endurance: Decreased tolerance for upright activites    Bed Mobility/Transfers: Bed Mobility 1  Bed Mobility 1: Supine to sitting  Level of Assistance 1: Maximum assistance, +2  Bed Mobility Comments 1: toward the right side of bed with head elevated ~30 degrees w/ cues for technique and increased time  Bed Mobility 2  Bed Mobility  2: Sitting to supine  Level of Assistance 2: Maximum assistance, +2  Bed Mobility 3  Bed Mobility 3: Rolling right, Rolling left  Level of Assistance 3: Maximum assistance  Bed Mobility Comments 3: using siderails as able    Sensation:  Light Touch: No apparent deficits  Sensation Comment: Denies tingling/numbness    Strength:  Strength Comments: BUEs=~3+/5    Hand Function:  Hand Function  Gross Grasp: Functional  Coordination: Functional    Extremities: RUE   RUE : Within Functional Limits and LUE   LUE: Within Functional Limits    Outcome Measures: [facility] Daily Activity  Putting on and taking off regular lower body clothing: Total  Bathing (including washing, rinsing, drying): A lot  Putting on and taking off regular upper body clothing: A little  Toileting, which includes using toilet, bedpan or urinal: Total  Taking care of personal grooming such as brushing teeth: A  little  Eating Meals: A little  Daily Activity - Total Score: 13    Education Documentation  ADL Training, taught by Elizabeth Odonnell, OT at 6/18/2025  2:11 PM.  Learner: Patient  Readiness: Acceptance  Method: Explanation  Response: Demonstrated Understanding, Needs Reinforcement  Comment: Functional transfer retraining initiated    Goals:   Encounter Problems       Encounter Problems (Active)       OT Goals       ADLs (Progressing)       Start:  06/18/25    Expected End:  07/02/25       Patient will complete ADL tasks, with minimal assist  using AE need in order to increase patient's safety and independence with self-care tasks.           Functional transfers (Progressing)       Start:  06/18/25    Expected End:  07/02/25       Patient will complete functional transfers with moderate assist using least restrictive device, in order to increase patient's safety and independence with daily tasks.           Activity tolerance (Progressing)       Start:  06/18/25    Expected End:  07/02/25       Patient will demonstrate the ability to participate in functional activity at least >/= 25 minutes in order to increase patient's safety and independence with daily tasks.

## 2025-06-18 NOTE — CARE PLAN
The patient's goals for the shift include Iv antibiotics    The clinical goals for the shift include patient remain comfortable through out shift    Over the shift, the patient did not make progress toward the following goals. Barriers to progression include . Recommendations to address these barriers include .      Problem: Skin  Goal: Prevent/manage excess moisture  Flowsheets (Taken 6/18/2025 0031)  Prevent/manage excess moisture: Cleanse incontinence/protect with barrier cream

## 2025-06-18 NOTE — PROGRESS NOTES
Pati Frederick is a 79 y.o. female on day 3 of admission presenting with Cellulitis of right lower extremity.    Subjective   Patient seen and examined.  Resting in be din no acute distress.  Awake alert oriented x 3.  Forgetful.  Complains of left hip pain.  On questioning, right side abdominal pain improved.  Patient denies having a bowel movement though bowel movements are documented.  No nausea or vomiting.  Tolerating diet.    Objective     Physical Exam  Vitals and nursing note reviewed.   Constitutional:       General: She is not in acute distress.     Appearance: Normal appearance. She is normal weight. She is not ill-appearing, toxic-appearing or diaphoretic.   HENT:      Head: Normocephalic and atraumatic.      Right Ear: External ear normal.      Left Ear: External ear normal.      Nose: Nose normal.      Mouth/Throat:      Mouth: Mucous membranes are moist.      Pharynx: Oropharynx is clear.   Eyes:      Extraocular Movements: Extraocular movements intact.      Conjunctiva/sclera: Conjunctivae normal.      Pupils: Pupils are equal, round, and reactive to light.   Cardiovascular:      Rate and Rhythm: Normal rate. Rhythm irregular.      Pulses: Normal pulses.      Heart sounds: Normal heart sounds. No murmur heard.  Pulmonary:      Effort: Pulmonary effort is normal. No respiratory distress.      Breath sounds: Normal breath sounds. No wheezing, rhonchi or rales.      Comments: Room air.  Abdominal:      General: Bowel sounds are normal. There is no distension.      Palpations: Abdomen is soft.      Tenderness: There is abdominal tenderness. There is no guarding.      Comments: Slight right sided abdominal tenderness improved.   Genitourinary:     Comments: Deferred.  Musculoskeletal:         General: Swelling present. Normal range of motion.      Cervical back: Normal range of motion and neck supple.      Right lower leg: Edema present.      Comments: Right lower extremity post-operative dressing clean  "dry intact.  No blood.   Skin:     General: Skin is warm and dry.      Capillary Refill: Capillary refill takes less than 2 seconds.      Comments: Right lower extremity post-operative dressing clean dry intact.  No blood.   Neurological:      General: No focal deficit present.      Mental Status: She is alert and oriented to person, place, and time.   Psychiatric:         Mood and Affect: Mood normal.         Behavior: Behavior normal.       Last Recorded Vitals  Blood pressure 123/68, pulse 74, temperature 36.3 °C (97.3 °F), temperature source Oral, resp. rate 16, height 1.651 m (5' 5\"), weight 100 kg (220 lb 7.4 oz), SpO2 97%.    Intake/Output last 3 Shifts:  I/O last 3 completed shifts:  In: 885 (8.9 mL/kg) [I.V.:285 (2.9 mL/kg); IV Piggyback:600]  Out: 1010 (10.1 mL/kg) [Urine:1000 (0.3 mL/kg/hr); Blood:10]  Weight: 100 kg     Relevant Results  Results for orders placed or performed during the hospital encounter of 06/15/25 (from the past 24 hours)   Troponin I, High Sensitivity   Result Value Ref Range    Troponin I, High Sensitivity 12 0 - 13 ng/L   CBC   Result Value Ref Range    WBC 10.0 4.4 - 11.3 x10*3/uL    nRBC 0.0 0.0 - 0.0 /100 WBCs    RBC 4.10 4.00 - 5.20 x10*6/uL    Hemoglobin 11.5 (L) 12.0 - 16.0 g/dL    Hematocrit 36.4 36.0 - 46.0 %    MCV 89 80 - 100 fL    MCH 28.0 26.0 - 34.0 pg    MCHC 31.6 (L) 32.0 - 36.0 g/dL    RDW 14.3 11.5 - 14.5 %    Platelets 272 150 - 450 x10*3/uL   Basic Metabolic Panel   Result Value Ref Range    Glucose 125 (H) 74 - 99 mg/dL    Sodium 136 136 - 145 mmol/L    Potassium 4.8 3.5 - 5.3 mmol/L    Chloride 105 98 - 107 mmol/L    Bicarbonate 23 21 - 32 mmol/L    Anion Gap 13 10 - 20 mmol/L    Urea Nitrogen 25 (H) 6 - 23 mg/dL    Creatinine 0.85 0.50 - 1.05 mg/dL    eGFR 69 >60 mL/min/1.73m*2    Calcium 8.9 8.6 - 10.3 mg/dL     No results found for the last 90 days.    CT abdomen pelvis wo IV contrast  Result Date: 6/17/2025  Interpreted By:  Fátima Contreras, STUDY: CT ABDOMEN " PELVIS WO IV CONTRAST;  6/17/2025 3:44 pm   INDICATION: Signs/Symptoms:Abdominal pain.     COMPARISON: None   ACCESSION NUMBER(S): [id number]   ORDERING CLINICIAN: SUNIL DANIELS   TECHNIQUE: CT of the abdomen and pelvis was performed. Contiguous axial images were obtained at 3 mm slice thickness through the abdomen and pelvis. Coronal and sagittal reconstructions at 3 mm slice thickness were performed.  No intravenous or oral contrast was administered.   FINDINGS: Please note that the evaluation of vessels, lymph nodes and organs is limited without intravenous contrast.   LOWER CHEST: There is some mild traction bronchiectasis within the posterior basal segment of each lower lobe. No pericardial or pleural abnormality is seen. The left hemidiaphragm is mildly elevated.   ABDOMEN:   LIVER: Mild hepatomegaly is seen with the liver measuring 18.1 cm in superior to inferior dimension. The density of the hepatic parenchyma is normal. No space-occupying hepatic lesion is seen.   BILE DUCTS: The intrahepatic and extrahepatic ducts are not dilated.   GALLBLADDER: The gallbladder is distended with transverse diameter of 5.3 cm. There is a small amount of hyperdense material layering along the dependent portion of the gallbladder lumen most likely representing small gallstones. No gallbladder wall thickening is evident.   PANCREAS: The pancreas appears unremarkable without evidence of ductal dilatation or masses.   SPLEEN: The spleen is normal in size without focal lesions.   ADRENAL GLANDS: There is a 2.4 cm hypodense right adrenal mass and a 1.3 cm hypodense left adrenal mass seen. The mean Hounsfield unit measurement of the right adrenal mass is a little less than 20 HU. This is most likely benign.   KIDNEYS AND URETERS: There are bilateral renal cysts noted with the largest cyst seen on the left measuring nearly 10 cm in greatest length. There are a number of right renal cysts noted with the largest of these measuring  up to 5 cm in size. There is no hydronephrosis or nephrolithiasis of either kidney.   PELVIS:   BLADDER: The urinary bladder appears normal without abnormal wall thickening.   REPRODUCTIVE ORGANS: The uterus is within normal-sized limits but there is a large amount of gas identified within the uterine cavity. No adnexal mass is seen.   BOWEL: There is postoperative change from partial colonic resection with reanastomosis in the sigmoid region and additional colonic reanastomosis at the level of the transverse colon. There is a large amount of fecal loading seen. Small bowel is unremarkable in appearance as is the nondistended stomach.   VESSELS: Atherosclerosis of the abdominal aorta and iliac arteries is seen. There is no abdominal aortic aneurysm. There is also atherosclerosis of the celiac axis and splenic artery.   PERITONEUM/RETROPERITONEUM/LYMPH NODES: There is no free or loculated fluid collection, no free intraperitoneal air. No retroperitoneal hemorrhage is observed.   ABDOMINAL WALL: Postoperative change from ventral hernia repair is seen with mesh appearing intact.   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 There is a large Schmorl's node of the superior endplate of L5.       1.  Large amount of gas seen within the uterine cavity without uterine enlargement. This could be secondary to endometritis, post intrauterine instrumentation, or even abnormal fistulous connection to the gastrointestinal tract although no obvious fistula is seen. 2. Mild hepatomegaly. 3. Distended gallbladder with cholelithiasis. 4. Large amount of fecal burden within the colon. 5. Multiple right renal cysts with large left renal cyst. 6. Postoperative change from partial colonic resection and ventral hernia repair. 7. Bilateral adrenal masses, probably benign.     MACRO: None   Signed by: Fátima Contreras 6/17/2025 4:37 PM Dictation workstation:   [id number]    XR chest 1 view  Result Date: 6/17/2025  Interpreted By:  Ben  Fátima, STUDY: XR CHEST 1 VIEW 6/17/2025 2:51 pm   INDICATION: Signs/Symptoms:Dyspnea   COMPARISON: None available.   ACCESSION NUMBER(S): [id number]   ORDERING CLINICIAN: SUNIL DANIELS   TECHNIQUE: AP erect view of the chest   FINDINGS: The cardiac size is at the upper limits of normal with calcified plaque seen in the aortic knob. There is mild pulmonary vascular congestion with cephalization of the pulmonary vasculature. No pleural abnormality is seen.       Borderline cardiomegaly with mild pulmonary vascular congestion.   Signed by: Fátima Contreras 6/17/2025 3:36 PM Dictation workstation:   [id number]    Electrocardiogram, 12-lead PRN ACS symptoms  Result Date: 6/17/2025  Atrial flutter Abnormal ECG When compared with ECG of 15-XIANG-2025 07:20, (unconfirmed) Atrial flutter has replaced Sinus rhythm Vent. rate has decreased BY  62 BPM ST no longer depressed in Inferior leads Inverted T waves have replaced nonspecific T wave abnormality in Inferior leads Nonspecific T wave abnormality no longer evident in Lateral leads      Scheduled medications  Scheduled Medications[1]  Continuous medications  Continuous Medications[2]  PRN medications  PRN Medications[3]    ASSESSMENT:  Right lower extremity injury / trauma  Right lower extremity cellulitis  Right lower extremity hematoma status post OR I&D, hematoma evacuation  Normocytic anemia stable  Iron deficiency  Pyuria -   Leukocytosis resolved  Toxic encephalopathy  Dementia  Hypothyroidism  Overactive bladder  Left hip pain, chronic  Abdominal pain improved   Constipation - resolved  Atrial fibrillation / atrial flutter    PLAN:  No new issues.  Overall condition improved.  Status post OR I&D, hematoma evacuation.  Post-operative day #1.  Maintain post-operative dressing.  Management per general surgery.  H&H stable.  Monitor H&H.  As needed analgesics.  Continue broad-spectrum IV antibiotics.  Intra-operative cultures pending.  Follow-up.  Monitor right lower  extremity cellulitis.  Monitor temperature and white blood cell count.  Urine culture suspected contamination.  Continue EUN hygiene.  Monitor I's and O's.  Encourage p.o. intake.  6/17 CT abdomen/pelvis showed constipation.  Status post mineral oil enema with results.  Abdominal examination improved, non distended.  Right sided abdominal tenderness, improved.  Repeat KUB.  Follow-up results.  Continue MiraLAX twice daily.  Cardiology input appreciated.  Monitor heart rate and blood pressure.  Chart reviewed.  Medications reviewed.  Medical management per cardiology.  Amiodarone.  Oral anticoagulation Eliquis held due to the hematoma - start in 2 weeks.  Follow-up with Dr. Dukes in 2-3 weeks.   PT/OT as tolerated.  Fall precautions.  Up with assistance only.  Bed and chair alarm at all times.  Pressure ulcer prevention measures.  Turn and reposition every 2 hours and as needed.  Heels off bed.  Offloading.  DVT prophylaxis.  Heparin subcutaneous.  GI prophylaxis.  PPI.  Protonix.  Supportive care.  Patient reassured.  Case management following for discharge planning.  Discharge plan home when medically cleared.  Discussed with patient.  Discussed with Dr. Rich.       ADDENDUM:  Secure message with general surgery, evaluation and note reviewed, media photo reviewed, await intra-operative cultures, continue broad-spectrum IV antibiotics.  Discussed with Dr. Rich.      Sofiya Marquez, APRN-CNP         [1] amiodarone, 100 mg, oral, Daily  [START ON 7/2/2025] apixaban, 5 mg, oral, BID  atorvastatin, 20 mg, oral, Nightly  [Held by provider] citalopram, 20 mg, oral, Daily  donepezil, 5 mg, oral, Nightly  ferrous sulfate, 1 tablet, oral, Daily with breakfast  heparin (porcine), 5,000 Units, subcutaneous, q8h  levothyroxine, 150 mcg, oral, Daily  lidocaine, 1 patch, transdermal, Daily  oxyBUTYnin, 2.5 mg, oral, BID  pantoprazole, 40 mg, oral, Daily before breakfast  piperacillin-tazobactam, 4.5 g, intravenous,  q6h  polyethylene glycol, 17 g, oral, BID  vancomycin, 1,250 mg, intravenous, q24h     [2]    [3] PRN medications: acetaminophen **OR** acetaminophen **OR** acetaminophen, benzocaine-menthol, dextromethorphan-guaifenesin, diclofenac sodium, guaiFENesin, [Transfer Hold] HYDROcodone-acetaminophen, hydrOXYzine HCL, melatonin, ondansetron **OR** ondansetron, polyethylene glycol, traMADol, vancomycin

## 2025-06-18 NOTE — CARE PLAN
The patient's goals for the shift include Iv antibiotics    The clinical goals for the shift include Pain control        Problem: Safety - Adult  Goal: Free from fall injury  Outcome: Progressing     Problem: Discharge Planning  Goal: Discharge to home or other facility with appropriate resources  Outcome: Progressing     Problem: Chronic Conditions and Co-morbidities  Goal: Patient's chronic conditions and co-morbidity symptoms are monitored and maintained or improved  Outcome: Progressing     Problem: Nutrition  Goal: Nutrient intake appropriate for maintaining nutritional needs  Outcome: Progressing     Problem: Skin  Goal: Decreased wound size/increased tissue granulation at next dressing change  Outcome: Progressing  Goal: Participates in plan/prevention/treatment measures  Outcome: Progressing  Goal: Prevent/manage excess moisture  Outcome: Progressing  Goal: Prevent/minimize sheer/friction injuries  Outcome: Progressing  Goal: Promote/optimize nutrition  Outcome: Progressing  Goal: Promote skin healing  Outcome: Progressing     Problem: Fall/Injury  Goal: Not fall by end of shift  Outcome: Progressing  Goal: Be free from injury by end of the shift  Outcome: Progressing  Goal: Verbalize understanding of personal risk factors for fall in the hospital  Outcome: Progressing  Goal: Verbalize understanding of risk factor reduction measures to prevent injury from fall in the home  Outcome: Progressing  Goal: Use assistive devices by end of the shift  Outcome: Progressing  Goal: Pace activities to prevent fatigue by end of the shift  Outcome: Progressing

## 2025-06-18 NOTE — CARE PLAN
The patient's goals for the shift include Iv antibiotics    The clinical goals for the shift include maintain comfort and safety    Over the shift, the patient did not make progress toward the following goals. Barriers to progression include . Recommendations to address these barriers include   Problem: Safety - Adult  Goal: Free from fall injury  Outcome: Progressing     Problem: Nutrition  Goal: Nutrient intake appropriate for maintaining nutritional needs  Outcome: Progressing   .

## 2025-06-18 NOTE — PROGRESS NOTES
Physical Therapy    Physical Therapy Treatment    Patient Name: Pati Frederick  MRN: [id number]  Department: [address]  Room: [address] [place] [address]  Today's Date: 6/18/2025  Time Calculation  Start Time: 1313  Stop Time: 1334  Time Calculation (min): 21 min    Assessment/Plan   PT Assessment  Barriers to Discharge Home: Physical needs  Physical Needs: Returning to long-term care/other facility (for rehab)  End of Session Communication: Bedside nurse  Assessment Comment: Pt made slow progress toward her functional mobility goals. Pt required dependent assist (maxA x2) during LIMITED functional mobility compared to her reported baseline. Pt would benefit from continued skilled PT services for maximizing independence and safety prior to & after discharge (MODERATE intensity).  End of Session Patient Position: Bed, 3 rail up, Alarm on (call light/needs within reach)     PT Plan  Treatment/Interventions: Bed mobility, Transfer training, Gait training, Strengthening, Therapeutic exercise, Therapeutic activity, Balance training  PT Plan: Ongoing PT  PT Frequency: 4 times per week  PT Discharge Recommendations: Moderate intensity level of continued care  Equipment Recommended upon Discharge: Lift  PT Recommended Transfer Status: Assist x2, Assistive device  PT - OK to Discharge: Yes    PT Visit Info:  PT Received On: 06/18/25     General Visit Information:   General  Reason for Referral: Impaired functional mobility due to decreased muscular strength. This 79 year old was admitted from post acute facility (rehab) for RLE cellulitis. Now s/p RLE (distal lower leg) wound debridement & hematoma evacuation (Dr. Loredo, 6/17/25)  Past Medical History Relevant to Rehab: dementia, A-fib, hypothryoid, colon CA s/p colectomy, hernia repair  Family/Caregiver Present: No  Co-Treatment: OT (to maximize safety of pt/staff during mobility)  Prior to Session Communication: Bedside nurse  Patient Position Received: Bed, 3 rail up, Alarm on (OT in  "room)  General Comment: Cleared by RN for participation. Pt agreed to tx and was fully engaged throughout.    Subjective   Precautions:  Precautions  Hearing/Visual Limitations: hearing intact; corrective lenses (donned)  Medical Precautions: Fall precautions     Date/Time Vitals Session Patient Position Pulse Resp SpO2 BP MAP (mmHg)    06/18/25 1311 During OT  --  125  --  --  --  --     06/18/25 1313 During PT  --  --  --  --  --  --         Comment:  in supine/HOB and moving BLE to faciliate dependent donning of socks.  toward end of session laying in bed.    Objective   Pain:  0-10 (Numeric) Pain Score: 7 (\"6-7\")  Pain Type: Acute pain  Pain Location: Leg  Pain Orientation: Lower, Right  Pain Interventions: Repositioned  Response to Interventions: Content/relaxed  Multiple Pain Sites: Two    Pain Score 2: 9 (FLACC scale)  Pain Type 2: Acute pain  Pain Location 2: Hip  Pain Orientation 2: Anterior, Proximal  Pain Interventions 2: Repositioned (in sitting with reported decrease in pain further decreased once in laying. RN notified after tx.)  Response to Interventions 2: Decrease in pain, Content/relaxed    Cognition:  Cognition  Overall Cognitive Status: Impaired at baseline  Orientation Level: Disoriented to time, Disoriented to situation (\"June 25th\" per OT report)     Postural Control:  Static Sitting Balance  Static Sitting-Balance Support: Bilateral upper extremity supported (BLE hoovering over floor)  Static Sitting-Level of Assistance: Close supervision  Static Sitting-Comment/Number of Minutes: </= 15 minutes     Activity Tolerance:  Activity Tolerance  Endurance: Decreased tolerance for upright activites (although significantly improved comparted to 6/16/25)  Treatments:  Bed Mobility 1  Bed Mobility 1: Supine to sitting  Level of Assistance 1: Maximum assistance, +2 (assist to pelvis via draw sheet and trunk. Maximal cues for sequencing. Increased time to complete. HOB elevated </=40° and " "used R bed rail.)  Bed Mobility 2  Bed Mobility  2: Sitting to supine  Level of Assistance 2: Maximum assistance, +2 (assist to elevate BLE & increased control of trunk descent. Bed flat, R rail up.)  Bed Mobility 3  Bed Mobility 3: Rolling right, Rolling left  Level of Assistance 3: Maximum assistance  Bed Mobility Comments 3: used bed rail. Performed for disposable draw sheet change to facilitate integumentary integrity.    Therapeutic Exercise  Therapeutic Exercise Performed: Yes (seated EOB, BLE: DF x15, ankle circles x10 each direction, knee ext x12.Performed R hip flex (march) x3 but c/o \"I can't\" & limited AROM at leaast partly 2° to pannus.Consistent verbal/visual/tactile cues for proper form; incorporated counting reps aloud)    Therapeutic Activity  Therapeutic Activity Performed: Yes (See above for time seated EOB for passive core strengthening and pulmonary hygiene.)    Outcome Measures:  [facility] Basic Mobility  Turning from your back to your side while in a flat bed without using bedrails: A lot  Moving from lying on your back to sitting on the side of a flat bed without using bedrails: Total  Moving to and from bed to chair (including a wheelchair): Total  Standing up from a chair using your arms (e.g. wheelchair or bedside chair): Total  To walk in hospital room: Total  Climbing 3-5 steps with railing: Total  Basic Mobility - Total Score: 7    Education Documentation  Mobility Training, taught by Mary Ann Traylor, PT at 6/18/2025  2:51 PM.  Learner: Patient  Readiness: Acceptance  Method: Explanation  Response: Needs Reinforcement  Comment: See cues during mobility and therex. Progress toward goals.    Education Comments  No comments found.        OP EDUCATION:       Encounter Problems       Encounter Problems (Active)       PT Problem       Pt will transition supine<>sit using hospital bed with close S and verbal cues. (Not met)       Start:  06/16/25    Expected End:  07/02/25    Resolved:  06/18/25    " Updated to: Pt will transition supine<>sit using hospital bed with modA x2.    Update reason: new data in EMR         Pt will transfer sit<>stand with FWW & Alejandra x1. (Not met)       Start:  06/16/25    Expected End:  07/02/25    Resolved:  06/18/25    Updated to: Pt will transfer bed<>WC using LRAD & maxA x2.    Update reason: new data in EMR         Pt will ambulate >/=10 ft with FWW & CGA x1. (Not met)       Start:  06/16/25    Expected End:  07/02/25    Resolved:  06/18/25    Updated to: Pt will tolerate >/= 20 minutes seated EOB engaging in BLE therex and multiplanar reaching with BUE with </= 4 rest breaks, close S & consistent cues.    Update reason: new data in EMR         Pt will transition supine<>sit using hospital bed with modA x2. (Progressing)       Start:  06/18/25    Expected End:  07/02/25                Pt will tolerate >/= 20 minutes seated EOB engaging in BLE therex and multiplanar reaching with BUE with </= 4 rest breaks, close S & consistent cues. (Progressing)       Start:  06/18/25    Expected End:  07/02/25                Pt will transfer bed<>WC using LRAD & maxA x2. (Not Progressing)       Start:  06/18/25    Expected End:  07/02/25

## 2025-06-18 NOTE — PROGRESS NOTES
"Pati Frederick is a 79 y.o. female on day 3 of admission presenting with Cellulitis of right lower extremity.    Subjective   She is feeling okay this morning.  Has a little bit of headache. Right leg is \"sore\".     Objective     Physical Exam  Vitals and nursing note reviewed.   Constitutional:       Appearance: Normal appearance. She is obese. She is not ill-appearing or toxic-appearing.   HENT:      Head: Normocephalic and atraumatic.   Cardiovascular:      Rate and Rhythm: Normal rate.   Pulmonary:      Effort: Pulmonary effort is normal.   Abdominal:      General: There is no distension.      Palpations: Abdomen is soft.      Tenderness: There is no abdominal tenderness.   Musculoskeletal:         General: No swelling or tenderness.      Right lower leg: Edema present.      Comments: Minimal edema, no surrounding erythema   Skin:     General: Skin is warm and dry.      Comments: RLE wound to lateral aspect with viable subcut tissue to wound bed.    Neurological:      Mental Status: She is alert and oriented to person, place, and time.     POD 1 wound debridement RLE    Last Recorded Vitals  Blood pressure 123/68, pulse 74, temperature 36.3 °C (97.3 °F), temperature source Oral, resp. rate 16, height 1.651 m (5' 5\"), weight 100 kg (220 lb 7.4 oz), SpO2 97%.  Intake/Output last 3 Shifts:  I/O last 3 completed shifts:  In: 885 (8.9 mL/kg) [I.V.:285 (2.9 mL/kg); IV Piggyback:600]  Out: 1010 (10.1 mL/kg) [Urine:1000 (0.3 mL/kg/hr); Blood:10]  Weight: 100 kg     Relevant Results  Lab Results   Component Value Date    GLUCOSE 125 (H) 06/18/2025    CALCIUM 8.9 06/18/2025     06/18/2025    K 4.8 06/18/2025    CO2 23 06/18/2025     06/18/2025    BUN 25 (H) 06/18/2025    CREATININE 0.85 06/18/2025     Lab Results   Component Value Date    WBC 10.0 06/18/2025    HGB 11.5 (L) 06/18/2025    HCT 36.4 06/18/2025    MCV 89 06/18/2025     06/18/2025     Assessment & Plan  Cellulitis of right lower " extremity    Hypothyroidism    Dementia    Atrial flutter (Multi)    Hematoma of right lower extremity    6/18: S/p OR debridement, POD 1. Wound evaluated and dressing changed. Will place wound care orders while inpatient. Also added supplements to facilitate wound healing. Continue broad spectrum abx until culture finalizes. Patient should follow up with the wound clinic upon discharge for continued wound care. Referral sent.     D/w Dr. Facundo GALEANA spent 25 minutes in the professional and overall care of this patient.      Claude Ramirez, APRN-CNP

## 2025-06-18 NOTE — PROGRESS NOTES
"Pati Frederick is a 79 y.o. female on day 3 of admission presenting with Cellulitis of right lower extremity.    Subjective   Alert and oriented x 2-3 with some confusion and poor recall.  No distress.  No chest pain.  Reports no significant elbow pain.  Remains in a rate controlled atrial fibrillation/flutter.        Objective     Physical Exam  Vitals and nursing note reviewed.   Constitutional:       General: She is not in acute distress.     Appearance: She is obese. She is ill-appearing. She is not toxic-appearing.   HENT:      Head: Normocephalic and atraumatic.      Nose: Nose normal.      Mouth/Throat:      Mouth: Mucous membranes are moist.      Pharynx: Oropharynx is clear.   Cardiovascular:      Rate and Rhythm: Normal rate. Rhythm irregular.      Pulses: Normal pulses.      Heart sounds: Normal heart sounds. No murmur heard.     No friction rub. No gallop.   Pulmonary:      Effort: Pulmonary effort is normal.      Breath sounds: Normal breath sounds.   Abdominal:      General: Bowel sounds are normal.      Palpations: Abdomen is soft.   Musculoskeletal:         General: Signs of injury present.      Cervical back: Normal range of motion.      Right lower leg: No edema.      Left lower leg: No edema.      Comments: Ace wrap with dressing noted to right lower extremity   Skin:     General: Skin is warm and dry.      Capillary Refill: Capillary refill takes less than 2 seconds.   Neurological:      Mental Status: She is alert. Mental status is at baseline.         Last Recorded Vitals  Blood pressure 128/73, pulse 97, temperature 36.3 °C (97.3 °F), temperature source Oral, resp. rate 18, height 1.651 m (5' 5\"), weight 100 kg (220 lb 7.4 oz), SpO2 97%.  Intake/Output last 3 Shifts:  I/O last 3 completed shifts:  In: 885 (8.9 mL/kg) [I.V.:285 (2.9 mL/kg); IV Piggyback:600]  Out: 1010 (10.1 mL/kg) [Urine:1000 (0.3 mL/kg/hr); Blood:10]  Weight: 100 kg     Relevant Results  Results for orders placed or performed " during the hospital encounter of 06/15/25 (from the past 24 hours)   Electrocardiogram, 12-lead PRN ACS symptoms   Result Value Ref Range    Ventricular Rate 60 BPM    Atrial Rate 214 BPM    QRS Duration 98 ms    QT Interval 432 ms    QTC Calculation(Bazett) 432 ms    P Axis 71 degrees    R Axis 9 degrees    T Axis 9 degrees    QRS Count 10 beats    Q Onset 216 ms    P Onset 109 ms    P Offset 174 ms    T Offset 432 ms    QTC Fredericia 432 ms   Tissue/Wound Culture/Smear    Specimen: CLOT/THROMBUS; Tissue   Result Value Ref Range    Gram Stain (1+) Rare Polymorphonuclear leukocytes (A)     Gram Stain (2+) Few Gram positive cocci (A)    Troponin I, High Sensitivity   Result Value Ref Range    Troponin I, High Sensitivity 12 0 - 13 ng/L   CBC   Result Value Ref Range    WBC 10.0 4.4 - 11.3 x10*3/uL    nRBC 0.0 0.0 - 0.0 /100 WBCs    RBC 4.10 4.00 - 5.20 x10*6/uL    Hemoglobin 11.5 (L) 12.0 - 16.0 g/dL    Hematocrit 36.4 36.0 - 46.0 %    MCV 89 80 - 100 fL    MCH 28.0 26.0 - 34.0 pg    MCHC 31.6 (L) 32.0 - 36.0 g/dL    RDW 14.3 11.5 - 14.5 %    Platelets 272 150 - 450 x10*3/uL   Basic Metabolic Panel   Result Value Ref Range    Glucose 125 (H) 74 - 99 mg/dL    Sodium 136 136 - 145 mmol/L    Potassium 4.8 3.5 - 5.3 mmol/L    Chloride 105 98 - 107 mmol/L    Bicarbonate 23 21 - 32 mmol/L    Anion Gap 13 10 - 20 mmol/L    Urea Nitrogen 25 (H) 6 - 23 mg/dL    Creatinine 0.85 0.50 - 1.05 mg/dL    eGFR 69 >60 mL/min/1.73m*2    Calcium 8.9 8.6 - 10.3 mg/dL                Assessment & Plan  Cellulitis of right lower extremity    Hypothyroidism    Dementia    Atrial flutter (Multi)    Hematoma of right lower extremity      Epigastric/back pain  Right upper quadrant tenderness on palpation  Atrial flutter with controlled heart rate response  Status post extraction hematoma right lower extremity 6/17/2025  History of colon cancer  Hypothyroidism     6/17: Patient now in atrial flutter but with a controlled heart rate response.   Etiology of the epigastric pain and back pain not clear.  No ischemic changes on the EKG but will obtain troponin levels.  With right upper quadrant tenderness to palpation a CT scan of the abdomen has been ordered will await results.  [id number] score of 3 thus anticoagulation would be recommended but will not at this point in time given the right lower extremity hematoma.  Again records suggest she was on amiodarone and Eliquis at some point in time though she is not able to confirm.  Will continue to monitor cardiac rhythm on telemetry and follow with you.    6/18: As above.  Stable overnight.  Denies complaints of significant pain last night or today.  Was found to be in atrial fibrillation yesterday.  Has remained in a rate controlled atrial fibrillation/flutter over the past 24 hours.  She has not on any rate limiting agents.  All documentation available indicates that the patient is supposed be taking amiodarone at this point as well as apixaban.  She does have a history of some mild anemia as well as falls, however she had remained on apixaban throughout this. Will restart amiodarone at 100 mg daily.  Will avoid initiation of apixaban at this time for her hematoma to the right lower extremity.  Would allow for a period of 2 weeks and will resume at that time.  Otherwise the patient is stable at this point with a cardiac perspective.  Chest pain-free.  Euvolemic on examination.  Will sign off.  Patient to follow-up with Dr Dukes in 2-3 weeks.       I spent 35 minutes in the professional and overall care of this patient.      Prabhjot Mariee, APRN-CNP

## 2025-06-19 LAB
ANION GAP SERPL CALCULATED.3IONS-SCNC: 11 MMOL/L (ref 10–20)
BACTERIA BLD CULT: NORMAL
BACTERIA BLD CULT: NORMAL
BUN SERPL-MCNC: 36 MG/DL (ref 6–23)
CALCIUM SERPL-MCNC: 8.7 MG/DL (ref 8.6–10.3)
CHLORIDE SERPL-SCNC: 107 MMOL/L (ref 98–107)
CO2 SERPL-SCNC: 25 MMOL/L (ref 21–32)
CREAT SERPL-MCNC: 1.04 MG/DL (ref 0.5–1.05)
EGFRCR SERPLBLD CKD-EPI 2021: 54 ML/MIN/1.73M*2
ERYTHROCYTE [DISTWIDTH] IN BLOOD BY AUTOMATED COUNT: 14.9 % (ref 11.5–14.5)
GLUCOSE SERPL-MCNC: 99 MG/DL (ref 74–99)
HCT VFR BLD AUTO: 34.3 % (ref 36–46)
HGB BLD-MCNC: 10.8 G/DL (ref 12–16)
MCH RBC QN AUTO: 28.6 PG (ref 26–34)
MCHC RBC AUTO-ENTMCNC: 31.5 G/DL (ref 32–36)
MCV RBC AUTO: 91 FL (ref 80–100)
NRBC BLD-RTO: 0 /100 WBCS (ref 0–0)
PLATELET # BLD AUTO: 268 X10*3/UL (ref 150–450)
POTASSIUM SERPL-SCNC: 3.9 MMOL/L (ref 3.5–5.3)
RBC # BLD AUTO: 3.78 X10*6/UL (ref 4–5.2)
SODIUM SERPL-SCNC: 139 MMOL/L (ref 136–145)
VANCOMYCIN SERPL-MCNC: 39 UG/ML (ref 5–20)
WBC # BLD AUTO: 13.5 X10*3/UL (ref 4.4–11.3)

## 2025-06-19 PROCEDURE — 99024 POSTOP FOLLOW-UP VISIT: CPT | Performed by: NURSE PRACTITIONER

## 2025-06-19 PROCEDURE — 85027 COMPLETE CBC AUTOMATED: CPT | Performed by: STUDENT IN AN ORGANIZED HEALTH CARE EDUCATION/TRAINING PROGRAM

## 2025-06-19 PROCEDURE — 2500000001 HC RX 250 WO HCPCS SELF ADMINISTERED DRUGS (ALT 637 FOR MEDICARE OP): Performed by: NURSE PRACTITIONER

## 2025-06-19 PROCEDURE — 2500000004 HC RX 250 GENERAL PHARMACY W/ HCPCS (ALT 636 FOR OP/ED): Performed by: STUDENT IN AN ORGANIZED HEALTH CARE EDUCATION/TRAINING PROGRAM

## 2025-06-19 PROCEDURE — 36415 COLL VENOUS BLD VENIPUNCTURE: CPT | Performed by: STUDENT IN AN ORGANIZED HEALTH CARE EDUCATION/TRAINING PROGRAM

## 2025-06-19 PROCEDURE — 2500000001 HC RX 250 WO HCPCS SELF ADMINISTERED DRUGS (ALT 637 FOR MEDICARE OP): Performed by: STUDENT IN AN ORGANIZED HEALTH CARE EDUCATION/TRAINING PROGRAM

## 2025-06-19 PROCEDURE — 2500000002 HC RX 250 W HCPCS SELF ADMINISTERED DRUGS (ALT 637 FOR MEDICARE OP, ALT 636 FOR OP/ED): Performed by: STUDENT IN AN ORGANIZED HEALTH CARE EDUCATION/TRAINING PROGRAM

## 2025-06-19 PROCEDURE — 2500000004 HC RX 250 GENERAL PHARMACY W/ HCPCS (ALT 636 FOR OP/ED): Performed by: INTERNAL MEDICINE

## 2025-06-19 PROCEDURE — 2500000005 HC RX 250 GENERAL PHARMACY W/O HCPCS: Performed by: STUDENT IN AN ORGANIZED HEALTH CARE EDUCATION/TRAINING PROGRAM

## 2025-06-19 PROCEDURE — 80048 BASIC METABOLIC PNL TOTAL CA: CPT | Performed by: STUDENT IN AN ORGANIZED HEALTH CARE EDUCATION/TRAINING PROGRAM

## 2025-06-19 PROCEDURE — 1210000001 HC SEMI-PRIVATE ROOM DAILY

## 2025-06-19 PROCEDURE — 80202 ASSAY OF VANCOMYCIN: CPT | Performed by: INTERNAL MEDICINE

## 2025-06-19 RX ORDER — VANCOMYCIN 1 G/200ML
1 INJECTION, SOLUTION INTRAVENOUS EVERY 24 HOURS
Status: DISCONTINUED | OUTPATIENT
Start: 2025-06-20 | End: 2025-06-20

## 2025-06-19 RX ADMIN — LIDOCAINE 4% 1 PATCH: 40 PATCH TOPICAL at 08:30

## 2025-06-19 RX ADMIN — HYDROXYZINE HYDROCHLORIDE 25 MG: 25 TABLET, FILM COATED ORAL at 17:05

## 2025-06-19 RX ADMIN — ATORVASTATIN CALCIUM 20 MG: 20 TABLET, FILM COATED ORAL at 21:00

## 2025-06-19 RX ADMIN — PIPERACILLIN SODIUM AND TAZOBACTAM SODIUM 4.5 G: 4; .5 INJECTION, SOLUTION INTRAVENOUS at 12:12

## 2025-06-19 RX ADMIN — OXYBUTYNIN CHLORIDE 2.5 MG: 5 TABLET ORAL at 08:30

## 2025-06-19 RX ADMIN — PANTOPRAZOLE SODIUM 40 MG: 40 TABLET, DELAYED RELEASE ORAL at 05:02

## 2025-06-19 RX ADMIN — TRAMADOL HYDROCHLORIDE 50 MG: 50 TABLET, COATED ORAL at 05:02

## 2025-06-19 RX ADMIN — PIPERACILLIN SODIUM AND TAZOBACTAM SODIUM 4.5 G: 4; .5 INJECTION, SOLUTION INTRAVENOUS at 17:05

## 2025-06-19 RX ADMIN — PIPERACILLIN SODIUM AND TAZOBACTAM SODIUM 4.5 G: 4; .5 INJECTION, SOLUTION INTRAVENOUS at 23:50

## 2025-06-19 RX ADMIN — OXYBUTYNIN CHLORIDE 2.5 MG: 5 TABLET ORAL at 21:00

## 2025-06-19 RX ADMIN — VANCOMYCIN 1250 MG: 1.75 INJECTION, SOLUTION INTRAVENOUS at 08:35

## 2025-06-19 RX ADMIN — AMIODARONE HYDROCHLORIDE 100 MG: 100 TABLET ORAL at 08:25

## 2025-06-19 RX ADMIN — LEVOTHYROXINE SODIUM 150 MCG: 150 TABLET ORAL at 05:02

## 2025-06-19 RX ADMIN — DONEPEZIL HYDROCHLORIDE 5 MG: 5 TABLET, FILM COATED ORAL at 21:01

## 2025-06-19 RX ADMIN — FERROUS SULFATE TAB 325 MG (65 MG ELEMENTAL FE) 1 TABLET: 325 (65 FE) TAB at 08:31

## 2025-06-19 RX ADMIN — PIPERACILLIN SODIUM AND TAZOBACTAM SODIUM 4.5 G: 4; .5 INJECTION, SOLUTION INTRAVENOUS at 05:02

## 2025-06-19 RX ADMIN — HYDROXYZINE HYDROCHLORIDE 25 MG: 25 TABLET, FILM COATED ORAL at 08:37

## 2025-06-19 ASSESSMENT — COGNITIVE AND FUNCTIONAL STATUS - GENERAL
EATING MEALS: A LOT
DAILY ACTIVITIY SCORE: 12
MOVING FROM LYING ON BACK TO SITTING ON SIDE OF FLAT BED WITH BEDRAILS: A LITTLE
WALKING IN HOSPITAL ROOM: A LOT
TURNING FROM BACK TO SIDE WHILE IN FLAT BAD: A LOT
MOVING TO AND FROM BED TO CHAIR: A LOT
DRESSING REGULAR UPPER BODY CLOTHING: A LOT
HELP NEEDED FOR BATHING: A LOT
PERSONAL GROOMING: A LOT
DRESSING REGULAR LOWER BODY CLOTHING: A LOT
CLIMB 3 TO 5 STEPS WITH RAILING: A LOT
TOILETING: A LOT
STANDING UP FROM CHAIR USING ARMS: A LOT
MOBILITY SCORE: 13

## 2025-06-19 ASSESSMENT — PAIN - FUNCTIONAL ASSESSMENT
PAIN_FUNCTIONAL_ASSESSMENT: 0-10

## 2025-06-19 ASSESSMENT — PAIN SCALES - GENERAL
PAINLEVEL_OUTOF10: 5 - MODERATE PAIN
PAINLEVEL_OUTOF10: 0 - NO PAIN
PAINLEVEL_OUTOF10: 0 - NO PAIN

## 2025-06-19 ASSESSMENT — PAIN DESCRIPTION - DESCRIPTORS
DESCRIPTORS: ACHING
DESCRIPTORS: ACHING

## 2025-06-19 NOTE — PROGRESS NOTES
"Physical Therapy                 Therapy Communication Note    Patient Name: Pati Frederick  MRN: [id number]  Department: [address]  Room: [facility]  Today's Date: 6/19/2025     Discipline: Physical Therapy    Missed Visit Reason: Missed Visit Reason: Patient refused (Pt declined participation with therapy stating, \"I can't today they have been moving me all over today.\" RN notified.)          "

## 2025-06-19 NOTE — CARE PLAN
Problem: Safety - Adult  Goal: Free from fall injury  Outcome: Progressing   The patient's goals for the shift include Iv antibiotics    The clinical goals for the shift include rest, pain control

## 2025-06-19 NOTE — DOCUMENTATION CLARIFICATION NOTE
"    PATIENT:               LUIS FELIPE DAVEY  [id number]  MRN:                       [id number]  :                       1945  ADMIT DATE:       6/15/2025 7:04 AM  DISCH DATE:  RESPONDING PROVIDER #:        [id number]          PROVIDER RESPONSE TEXT:    Excisional debridement down to and including subcutaneous tissue and/or fascia    CDI QUERY TEXT:    Clarification        Instruction:    Based on your assessment of the patient and the clinical information, please provide the requested documentation by clicking on the appropriate radio button and enter any additional information if prompted.    Question: Please further clarify the debridement procedure of lower right extremity as    When answering this query, please exercise your independent professional judgment. The fact that a question is being asked, does not imply that any particular answer is desired or expected.    The patient's clinical indicators include:  Clinical Information: This query refers to the procedure performed on 25 and documented as DEBRIDEMENT, WOUND, LOWER EXTREMITY (R),    Operative Note 25 by Dr. Elba Loredo:    \"Additional skin needed to be debrided for a final wound measurement of 9 x 5 cm\"  Options provided:  -- Excisional debridement to and including skin  -- Excisional debridement down to and including subcutaneous tissue and/or fascia  -- Excisional debridement down to and including muscle  -- Excisional debridement down to and including tendon  -- Excisional debridement down to and including bone, Please specify bone involved below  -- Non-excisional debridement to and including skin  -- Non-excisional debridement down to and including subcutaneous tissue and/or fascia  -- Non-excisional debridement down to and including muscle  -- Non-excisional debridement down to and including bone, Please specify bone involved below  -- Other - I will add my own diagnosis  -- Refer to Clinical Documentation " Reviewer    Query created by: Lily Gonzalez on 6/18/2025 3:30 PM      Electronically signed by:  SOHA SANTANA MD 6/19/2025 6:00 AM

## 2025-06-19 NOTE — PROGRESS NOTES
Vancomycin Dosing by Pharmacy- FOLLOW UP    Pati Frederick is a 79 y.o. year old female who Pharmacy has been consulted for vancomycin dosing for cellulitis, skin and soft tissue. Based on the patient's indication and renal status this patient is being dosed based on a goal AUC of 400-600.     Renal function is currently stable.    Current vancomycin dose: 1250 mg given every 24 hours    Estimated vancomycin AUC on current dose: 610 mg/L.hr     Visit Vitals  /66 (BP Location: Left arm, Patient Position: Lying)   Pulse (!) 117   Temp 36.7 °C (98.1 °F) (Oral)   Resp 20        Lab Results   Component Value Date    CREATININE 1.04 2025    CREATININE 0.85 2025    CREATININE 0.99 2025    CREATININE 0.87 2025        Patient weight is as follows:   Vitals:    06/15/25 0714   Weight: 100 kg (220 lb 7.4 oz)       Cultures:  Susceptibility data for the encounter in last 14 days.  Collected Specimen Info Organism   25 Tissue from CLOT/THROMBUS Enterococcus faecalis        I/O last 3 completed shifts:  In: 240 (2.4 mL/kg) [P.O.:240]  Out: 1600 (16 mL/kg) [Urine:1600 (0.4 mL/kg/hr)]  Weight: 100 kg   I/O during current shift:  I/O this shift:  In: 375 [P.O.:375]  Out: -     Temp (24hrs), Av.7 °C (98 °F), Min:36.6 °C (97.8 °F), Max:36.7 °C (98.1 °F)      Assessment/Plan    Above goal AUC. Orders placed for new vancomcyin regimen of 1000 mg  every 24 hours to begin at 10:00 on .    This dosing regimen is predicted by [employer] to result in the following pharmacokinetic parameters:  Loading dose: N/A  Regimen: 1000 mg IV every 24 hours.  Start time: 08:35 on 2025  Exposure target: AUC24 (range) 400-600 mg/L.hr   [id number]: 504 mg/L.hr  AUC24,ss: 497 mg/L.hr  Probability of AUC24 > 400: 92 %  Ctrough,ss: 14.1 mg/L  Probability of Ctrough,ss > 20: 9 %      The next level will be obtained on  at 5:00. May be obtained sooner if clinically indicated.   Will continue to monitor renal  function daily while on vancomycin and order serum creatinine at least every 48 hours if not already ordered.  Follow for continued vancomycin needs, clinical response, and signs/symptoms of toxicity.       Duarte Lai, [facility]

## 2025-06-19 NOTE — PROGRESS NOTES
Pati Frederick is a 79 y.o. female on day 4 of admission presenting with Cellulitis of right lower extremity.    Subjective   Demented     Objective     Vital signs in last 24 hours:  Temp:  [36.6 °C (97.8 °F)-36.7 °C (98.1 °F)] 36.7 °C (98.1 °F)  Heart Rate:  [] 117  Resp:  [16-20] 20  BP: (106-116)/(56-89) 116/66  Heart Rate:  []   Temp:  [36.6 °C (97.8 °F)-36.7 °C (98.1 °F)]   Resp:  [16-20]   BP: (106-116)/(56-89)   SpO2:  [95 %-97 %]      Intake/Output last 3 Shifts:  I/O last 3 completed shifts:  In: 240 (2.4 mL/kg) [P.O.:240]  Out: 1600 (16 mL/kg) [Urine:1600 (0.4 mL/kg/hr)]  Weight: 100 kg     Physical Exam  Came to see patient at 1200 but she just got her lunch-so came back at 1303 and seen then  No acute distress  Nonseptic appearing  Looks fine and comfortable  Alert, demented  Right lower extremity wound-without any issues-dressing changed:     Media Information    Document Information    Misc Clinical: Clinical Unknown      06/19/2025 13:08   Attached To:   Hospital Encounter on 6/15/25   Source Information    Angeles Webber, ZAHEER-CNP  [facility] General Surgery   Document History        Scheduled medications  Scheduled Medications[1]  Continuous medications  Continuous Medications[2]  PRN medications  PRN Medications[3]    Relevant Results  Results from last 7 days   Lab Units 06/19/25  1148 06/18/25  0452 06/17/25  0708   WBC AUTO x10*3/uL 13.5* 10.0 8.1   HEMOGLOBIN g/dL 10.8* 11.5* 10.4*   HEMATOCRIT % 34.3* 36.4 33.8*   PLATELETS AUTO x10*3/uL 268 272 255      Results from last 7 days   Lab Units 06/19/25  1148 06/18/25  0452 06/17/25  0708   SODIUM mmol/L 139 136 139   POTASSIUM mmol/L 3.9 4.8 5.0   CHLORIDE mmol/L 107 105 107   CO2 mmol/L 25 23 25   BUN mg/dL 36* 25* 29*   CREATININE mg/dL 1.04 0.85 0.99   GLUCOSE mg/dL 99 125* 97   CALCIUM mg/dL 8.7 8.9 8.7       Susceptibility data from last 7 days.  Collected Specimen Info Organism   06/17/25 Tissue from CLOT/THROMBUS Enterococcus  faecalis       Cardiology, Vascular, and Other Imaging  No other imaging results found for the past 2 days       Assessment/Plan   Assessment & Plan  Cellulitis of right lower extremity  Hematoma of right lower extremity  Status post debridement and hematoma evacuation of right lower extremity 6/17  Stable  Has current inpatient wound care orders-copied and also made for when patient is discharged  Antibiotics per ID-on Zosyn and IV Vancomycin  6/17 culture pending with few enterococcus faecalis, rare polymorphonuclear leukocytes, and few gram positive cocci  Patient should follow-up with the wound clinic upon discharge for continued wound care-referral previously sent 6/18  Will sign off and see patient in future upon request      ZAHEER Cuevas-CNP               [1] amiodarone, 100 mg, oral, Daily  [START ON 7/2/2025] apixaban, 5 mg, oral, BID  atorvastatin, 20 mg, oral, Nightly  [Held by provider] citalopram, 20 mg, oral, Daily  donepezil, 5 mg, oral, Nightly  ferrous sulfate, 1 tablet, oral, Daily with breakfast  heparin (porcine), 5,000 Units, subcutaneous, q8h  levothyroxine, 150 mcg, oral, Daily  lidocaine, 1 patch, transdermal, Daily  oxyBUTYnin, 2.5 mg, oral, BID  pantoprazole, 40 mg, oral, Daily before breakfast  piperacillin-tazobactam, 4.5 g, intravenous, q6h  polyethylene glycol, 17 g, oral, BID  [START ON 6/20/2025] vancomycin, 1 g, intravenous, q24h  [2]    [3] PRN medications: acetaminophen **OR** acetaminophen **OR** acetaminophen, benzocaine-menthol, dextromethorphan-guaifenesin, diclofenac sodium, guaiFENesin, HYDROcodone-acetaminophen, hydrOXYzine HCL, melatonin, ondansetron **OR** ondansetron, polyethylene glycol, traMADol, vancomycin

## 2025-06-19 NOTE — PROGRESS NOTES
Anticipate discharge soon. Patient's family adamant that she will return home and not go back to any rehab. Patient was active in the recent past with aide service through [facility]. External [facility] referral completed for SN for wound care, PT, OT and aide. Referral sent to [facility]. Will continue to follow.      06/19/25 9432   Discharge Planning   Home or Post Acute Services In home services   Type of Home Care Services Home health aide;Home nursing visits;Home OT;Home PT   Expected Discharge Disposition Home Health  ([facility])   Does the patient need discharge transport arranged? Yes   RoundTrip coordination needed? Yes

## 2025-06-19 NOTE — CARE PLAN
Problem: Safety - Adult  Goal: Free from fall injury  6/19/2025 1038 by Alize Kelly, RN  Outcome: Progressing  6/19/2025 1032 by Alize Kelly, RN  Outcome: Progressing   The patient's goals for the shift include Iv antibiotics    The clinical goals for the shift include no falls, manage pain

## 2025-06-19 NOTE — CARE PLAN
Problem: Safety - Adult  Goal: Free from fall injury  6/19/2025 1038 by Alize Kelly RN  Outcome: Progressing  6/19/2025 1038 by Alize Kelly RN  Outcome: Progressing  6/19/2025 1032 by Alize Kelly RN  Outcome: Progressing   The patient's goals for the shift include Iv antibiotics    The clinical goals for the shift include no falls, manage pain

## 2025-06-19 NOTE — ASSESSMENT & PLAN NOTE
Status post debridement and hematoma evacuation of right lower extremity 6/17  Stable  Has current inpatient wound care orders-copied and also made for when patient is discharged  Antibiotics per ID-on Zosyn and IV Vancomycin  6/17 culture pending with few enterococcus faecalis, rare polymorphonuclear leukocytes, and few gram positive cocci  Patient should follow-up with the wound clinic upon discharge for continued wound care-referral previously sent 6/18  Will sign off and see patient in future upon request

## 2025-06-19 NOTE — CARE PLAN
The patient's goals for the shift include Iv antibiotics    The clinical goals for the shift include rest, pain control    Over the shift, the patient did not make progress toward the following goals. Barriers to progression include . Recommendations to address these barriers include .

## 2025-06-19 NOTE — PROGRESS NOTES
Pati Frederick is a 79 y.o. female on day 4 of admission presenting with Cellulitis of right lower extremity.    Subjective   Patient seen and examined.  Resting in bed in no acute distress.  Awake alert oriented x 2.  States name birth-date states she is not in a hospital, states the month and year correctly.  No complaints.  No abdominal pain.  No lower extremity pain.  Review of systems is limited.     Objective     Physical Exam  Vitals and nursing note reviewed.   Constitutional:       General: She is not in acute distress.     Appearance: Normal appearance. She is normal weight. She is not ill-appearing, toxic-appearing or diaphoretic.   HENT:      Head: Normocephalic and atraumatic.      Right Ear: External ear normal.      Left Ear: External ear normal.      Nose: Nose normal.      Mouth/Throat:      Mouth: Mucous membranes are moist.      Pharynx: Oropharynx is clear.   Eyes:      Extraocular Movements: Extraocular movements intact.      Conjunctiva/sclera: Conjunctivae normal.      Pupils: Pupils are equal, round, and reactive to light.   Cardiovascular:      Rate and Rhythm: Regular rhythm. Tachycardia present.      Pulses: Normal pulses.      Heart sounds: Normal heart sounds. No murmur heard.  Pulmonary:      Effort: Pulmonary effort is normal. No respiratory distress.      Breath sounds: Normal breath sounds. No wheezing, rhonchi or rales.      Comments: Room air.  Abdominal:      General: Bowel sounds are normal. There is no distension.      Palpations: Abdomen is soft.      Tenderness: There is no abdominal tenderness. There is no guarding.   Genitourinary:     Comments: Deferred.  Musculoskeletal:         General: Swelling present. Normal range of motion.      Cervical back: Normal range of motion and neck supple.      Right lower leg: Edema present.      Comments: Right lower extremity post-operative dressing clean dry intact.  No blood.   Skin:     General: Skin is warm and dry.      Capillary  "Refill: Capillary refill takes less than 2 seconds.      Comments: Right lower extremity post-operative dressing clean dry intact.  No blood.   Neurological:      General: No focal deficit present.      Mental Status: She is alert and oriented to person, place, and time.   Psychiatric:         Mood and Affect: Mood normal.         Behavior: Behavior normal.       Last Recorded Vitals  Blood pressure 106/79, pulse (!) 115, temperature 36.7 °C (98.1 °F), temperature source Oral, resp. rate 20, height 1.651 m (5' 5\"), weight 100 kg (220 lb 7.4 oz), SpO2 95%.    Intake/Output last 3 Shifts:  I/O last 3 completed shifts:  In: 240 (2.4 mL/kg) [P.O.:240]  Out: 1600 (16 mL/kg) [Urine:1600 (0.4 mL/kg/hr)]  Weight: 100 kg     Telemetry sinus tachycardia rate 100's    Relevant Results  No results found for this or any previous visit (from the past 24 hours).    No results found for the last 90 days.    XR abdomen 1 view  Result Date: 6/18/2025  Interpreted By:  Fátima Contreras, STUDY: XR ABDOMEN 1 VIEW 6/18/2025 3:38 pm   INDICATION: Signs/Symptoms:Follow-up constipation   COMPARISON: CT examination of the abdomen and pelvis from 06/17/2025   ACCESSION NUMBER(S): [id number]   ORDERING CLINICIAN: SUNIL DANIELS   TECHNIQUE: Recumbent abdominal view   FINDINGS: There is postoperative change from ventral hernia repair. Surgical clips are present in right upper quadrant and within the pelvis with bowel anastomosis identified in the pelvis as well. The bowel gas pattern is nonspecific. There is no rectal impaction seen.   Re-identified is a roughly 3 cm collection of air or gas within right side of the pelvis, most likely within uterine cavity.       Nonspecific bowel gas pattern. No rectal impaction or abnormal fecal loading within the colon is currently demonstrated.   3 cm collection of air/gas within right side of the pelvis most likely remaining within the uterine cavity.   Signed by: Fátima Contreras 6/18/2025 3:44 PM Dictation " workstation:   [id number]    CT abdomen pelvis wo IV contrast  Result Date: 6/17/2025  Interpreted By:  Fátima Contreras, STUDY: CT ABDOMEN PELVIS WO IV CONTRAST;  6/17/2025 3:44 pm   INDICATION: Signs/Symptoms:Abdominal pain.     COMPARISON: None   ACCESSION NUMBER(S): [id number]   ORDERING CLINICIAN: SUNIL DANIELS   TECHNIQUE: CT of the abdomen and pelvis was performed. Contiguous axial images were obtained at 3 mm slice thickness through the abdomen and pelvis. Coronal and sagittal reconstructions at 3 mm slice thickness were performed.  No intravenous or oral contrast was administered.   FINDINGS: Please note that the evaluation of vessels, lymph nodes and organs is limited without intravenous contrast.   LOWER CHEST: There is some mild traction bronchiectasis within the posterior basal segment of each lower lobe. No pericardial or pleural abnormality is seen. The left hemidiaphragm is mildly elevated.   ABDOMEN:   LIVER: Mild hepatomegaly is seen with the liver measuring 18.1 cm in superior to inferior dimension. The density of the hepatic parenchyma is normal. No space-occupying hepatic lesion is seen.   BILE DUCTS: The intrahepatic and extrahepatic ducts are not dilated.   GALLBLADDER: The gallbladder is distended with transverse diameter of 5.3 cm. There is a small amount of hyperdense material layering along the dependent portion of the gallbladder lumen most likely representing small gallstones. No gallbladder wall thickening is evident.   PANCREAS: The pancreas appears unremarkable without evidence of ductal dilatation or masses.   SPLEEN: The spleen is normal in size without focal lesions.   ADRENAL GLANDS: There is a 2.4 cm hypodense right adrenal mass and a 1.3 cm hypodense left adrenal mass seen. The mean Hounsfield unit measurement of the right adrenal mass is a little less than 20 HU. This is most likely benign.   KIDNEYS AND URETERS: There are bilateral renal cysts noted with the largest cyst seen  on the left measuring nearly 10 cm in greatest length. There are a number of right renal cysts noted with the largest of these measuring up to 5 cm in size. There is no hydronephrosis or nephrolithiasis of either kidney.   PELVIS:   BLADDER: The urinary bladder appears normal without abnormal wall thickening.   REPRODUCTIVE ORGANS: The uterus is within normal-sized limits but there is a large amount of gas identified within the uterine cavity. No adnexal mass is seen.   BOWEL: There is postoperative change from partial colonic resection with reanastomosis in the sigmoid region and additional colonic reanastomosis at the level of the transverse colon. There is a large amount of fecal loading seen. Small bowel is unremarkable in appearance as is the nondistended stomach.   VESSELS: Atherosclerosis of the abdominal aorta and iliac arteries is seen. There is no abdominal aortic aneurysm. There is also atherosclerosis of the celiac axis and splenic artery.   PERITONEUM/RETROPERITONEUM/LYMPH NODES: There is no free or loculated fluid collection, no free intraperitoneal air. No retroperitoneal hemorrhage is observed.   ABDOMINAL WALL: Postoperative change from ventral hernia repair is seen with mesh appearing intact.   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 There is a large Schmorl's node of the superior endplate of L5.       1.  Large amount of gas seen within the uterine cavity without uterine enlargement. This could be secondary to endometritis, post intrauterine instrumentation, or even abnormal fistulous connection to the gastrointestinal tract although no obvious fistula is seen. 2. Mild hepatomegaly. 3. Distended gallbladder with cholelithiasis. 4. Large amount of fecal burden within the colon. 5. Multiple right renal cysts with large left renal cyst. 6. Postoperative change from partial colonic resection and ventral hernia repair. 7. Bilateral adrenal masses, probably benign.     MACRO: None   Signed  by: Fátima Contreras 6/17/2025 4:37 PM Dictation workstation:   [id number]    XR chest 1 view  Result Date: 6/17/2025  Interpreted By:  Fátima Contreras, STUDY: XR CHEST 1 VIEW 6/17/2025 2:51 pm   INDICATION: Signs/Symptoms:Dyspnea   COMPARISON: None available.   ACCESSION NUMBER(S): [id number]   ORDERING CLINICIAN: SUNIL DANIELS   TECHNIQUE: AP erect view of the chest   FINDINGS: The cardiac size is at the upper limits of normal with calcified plaque seen in the aortic knob. There is mild pulmonary vascular congestion with cephalization of the pulmonary vasculature. No pleural abnormality is seen.       Borderline cardiomegaly with mild pulmonary vascular congestion.   Signed by: Fátima Contreras 6/17/2025 3:36 PM Dictation workstation:   [id number]    Electrocardiogram, 12-lead PRN ACS symptoms  Result Date: 6/17/2025  Atrial flutter Abnormal ECG When compared with ECG of 15-XIANG-2025 07:20, (unconfirmed) Atrial flutter has replaced Sinus rhythm Vent. rate has decreased BY  62 BPM ST no longer depressed in Inferior leads Inverted T waves have replaced nonspecific T wave abnormality in Inferior leads Nonspecific T wave abnormality no longer evident in Lateral leads      Scheduled medications  Scheduled Medications[1]  Continuous medications  Continuous Medications[2]  PRN medications  PRN Medications[3]    ASSESSMENT:  Right lower extremity injury / trauma  Right lower extremity cellulitis  Right lower extremity hematoma status post OR I&D, hematoma evacuation infection culture + Enterococcus  Normocytic anemia stable  Iron deficiency  Pyuria -   Leukocytosis resolved  Toxic encephalopathy  Dementia  Hypothyroidism  Overactive bladder  Left hip pain, chronic  Abdominal pain improved   Constipation - resolved  Atrial fibrillation / atrial flutter    PLAN:  Patient is doing well this morning.  No new issues.  Overall condition is improved, stable.  Status post R I&D, hematoma evacuation.  Post-operative day #2.  Maintain  post-operative dressing.  Management per general surgery.  H&H stable.  Monitor H&H.  As needed analgesics.  Intraoperative cultures pending Enterococcus.  Continue broad-spectrum IV antibiotics.  Consult infectious disease.  Monitor temperature and white blood cell count.  Follow-up cultures.  Urine culture suspect contamination.  EUN hygiene.  I's and O's.  Encourage p.o. fluid intake.  Constipation resolved.  Avoid constipation.  Bowel regimen.  MiraLAX twice daily.  Cardiology input appreciated.  Continue medical management per cardiology.  Amiodarone.  Oral anticoagulation Eliquis on hold due to hematoma start in 2 weeks.  Follow-up with Dr. Dukes in 2 to 3 weeks.  Mentation stable.  Confused.  No focal deficits.  Suspect underlying dementia.  Monitor.  Supportive care.  PT/OT as tolerated.  Fall precautions.  Up with assistance only.  Bed and chair alarm at all times.  Pressure ulcer prevention measures.  Turn and reposition every 2 hours and as needed.  Has appropriate offloading.  DVT prophylaxis.  Heparin subcutaneous.  Monitor hematoma and H&H.  GI prophylaxis.  PPI Protonix for supportive care.  Patient reassured.  Case management following for discharge planning.  Discharge plan home with home health care when medically cleared.  Anticipate discharge after cleared by general surgery and infectious disease - when arrangements are made by case management.  Discussed with Dr. Rich.    Sofiya Marquez, APRN-CNP         [1] amiodarone, 100 mg, oral, Daily  [START ON 7/2/2025] apixaban, 5 mg, oral, BID  atorvastatin, 20 mg, oral, Nightly  [Held by provider] citalopram, 20 mg, oral, Daily  donepezil, 5 mg, oral, Nightly  ferrous sulfate, 1 tablet, oral, Daily with breakfast  heparin (porcine), 5,000 Units, subcutaneous, q8h  levothyroxine, 150 mcg, oral, Daily  lidocaine, 1 patch, transdermal, Daily  oxyBUTYnin, 2.5 mg, oral, BID  pantoprazole, 40 mg, oral, Daily before breakfast  piperacillin-tazobactam, 4.5  g, intravenous, q6h  polyethylene glycol, 17 g, oral, BID  vancomycin, 1,250 mg, intravenous, q24h     [2]    [3] PRN medications: acetaminophen **OR** acetaminophen **OR** acetaminophen, benzocaine-menthol, dextromethorphan-guaifenesin, diclofenac sodium, guaiFENesin, HYDROcodone-acetaminophen, hydrOXYzine HCL, melatonin, ondansetron **OR** ondansetron, polyethylene glycol, traMADol, vancomycin

## 2025-06-20 LAB
ANION GAP SERPL CALCULATED.3IONS-SCNC: 11 MMOL/L (ref 10–20)
BACTERIA SPEC CULT: ABNORMAL
BUN SERPL-MCNC: 34 MG/DL (ref 6–23)
CALCIUM SERPL-MCNC: 8.9 MG/DL (ref 8.6–10.3)
CHLORIDE SERPL-SCNC: 106 MMOL/L (ref 98–107)
CO2 SERPL-SCNC: 27 MMOL/L (ref 21–32)
CREAT SERPL-MCNC: 0.98 MG/DL (ref 0.5–1.05)
EGFRCR SERPLBLD CKD-EPI 2021: 58 ML/MIN/1.73M*2
ERYTHROCYTE [DISTWIDTH] IN BLOOD BY AUTOMATED COUNT: 15.2 % (ref 11.5–14.5)
GLUCOSE SERPL-MCNC: 90 MG/DL (ref 74–99)
GRAM STN SPEC: ABNORMAL
GRAM STN SPEC: ABNORMAL
HCT VFR BLD AUTO: 36.9 % (ref 36–46)
HGB BLD-MCNC: 12 G/DL (ref 12–16)
MCH RBC QN AUTO: 28.6 PG (ref 26–34)
MCHC RBC AUTO-ENTMCNC: 32.5 G/DL (ref 32–36)
MCV RBC AUTO: 88 FL (ref 80–100)
NRBC BLD-RTO: 0 /100 WBCS (ref 0–0)
PLATELET # BLD AUTO: 283 X10*3/UL (ref 150–450)
POTASSIUM SERPL-SCNC: 4.4 MMOL/L (ref 3.5–5.3)
RBC # BLD AUTO: 4.2 X10*6/UL (ref 4–5.2)
SODIUM SERPL-SCNC: 140 MMOL/L (ref 136–145)
VANCOMYCIN SERPL-MCNC: 19.7 UG/ML (ref 5–20)
WBC # BLD AUTO: 10.5 X10*3/UL (ref 4.4–11.3)

## 2025-06-20 PROCEDURE — 2500000004 HC RX 250 GENERAL PHARMACY W/ HCPCS (ALT 636 FOR OP/ED): Performed by: STUDENT IN AN ORGANIZED HEALTH CARE EDUCATION/TRAINING PROGRAM

## 2025-06-20 PROCEDURE — 36415 COLL VENOUS BLD VENIPUNCTURE: CPT | Performed by: STUDENT IN AN ORGANIZED HEALTH CARE EDUCATION/TRAINING PROGRAM

## 2025-06-20 PROCEDURE — 1210000001 HC SEMI-PRIVATE ROOM DAILY

## 2025-06-20 PROCEDURE — 97530 THERAPEUTIC ACTIVITIES: CPT | Mod: GO,CO

## 2025-06-20 PROCEDURE — 2500000005 HC RX 250 GENERAL PHARMACY W/O HCPCS: Performed by: STUDENT IN AN ORGANIZED HEALTH CARE EDUCATION/TRAINING PROGRAM

## 2025-06-20 PROCEDURE — 97530 THERAPEUTIC ACTIVITIES: CPT | Mod: GP,CQ

## 2025-06-20 PROCEDURE — 80202 ASSAY OF VANCOMYCIN: CPT | Performed by: INTERNAL MEDICINE

## 2025-06-20 PROCEDURE — 2500000001 HC RX 250 WO HCPCS SELF ADMINISTERED DRUGS (ALT 637 FOR MEDICARE OP): Performed by: NURSE PRACTITIONER

## 2025-06-20 PROCEDURE — 2500000002 HC RX 250 W HCPCS SELF ADMINISTERED DRUGS (ALT 637 FOR MEDICARE OP, ALT 636 FOR OP/ED): Performed by: STUDENT IN AN ORGANIZED HEALTH CARE EDUCATION/TRAINING PROGRAM

## 2025-06-20 PROCEDURE — 85027 COMPLETE CBC AUTOMATED: CPT | Performed by: STUDENT IN AN ORGANIZED HEALTH CARE EDUCATION/TRAINING PROGRAM

## 2025-06-20 PROCEDURE — 2500000001 HC RX 250 WO HCPCS SELF ADMINISTERED DRUGS (ALT 637 FOR MEDICARE OP): Performed by: STUDENT IN AN ORGANIZED HEALTH CARE EDUCATION/TRAINING PROGRAM

## 2025-06-20 PROCEDURE — 2500000001 HC RX 250 WO HCPCS SELF ADMINISTERED DRUGS (ALT 637 FOR MEDICARE OP): Performed by: INTERNAL MEDICINE

## 2025-06-20 PROCEDURE — 2500000004 HC RX 250 GENERAL PHARMACY W/ HCPCS (ALT 636 FOR OP/ED): Performed by: INTERNAL MEDICINE

## 2025-06-20 PROCEDURE — 80048 BASIC METABOLIC PNL TOTAL CA: CPT | Performed by: STUDENT IN AN ORGANIZED HEALTH CARE EDUCATION/TRAINING PROGRAM

## 2025-06-20 PROCEDURE — 2500000004 HC RX 250 GENERAL PHARMACY W/ HCPCS (ALT 636 FOR OP/ED): Performed by: NURSE PRACTITIONER

## 2025-06-20 PROCEDURE — 97535 SELF CARE MNGMENT TRAINING: CPT | Mod: GO,CO

## 2025-06-20 RX ORDER — AMOXICILLIN AND CLAVULANATE POTASSIUM 875; 125 MG/1; MG/1
1 TABLET, FILM COATED ORAL EVERY 12 HOURS SCHEDULED
Status: DISCONTINUED | OUTPATIENT
Start: 2025-06-20 | End: 2025-06-21 | Stop reason: HOSPADM

## 2025-06-20 RX ADMIN — HEPARIN SODIUM 5000 UNITS: 5000 INJECTION, SOLUTION INTRAVENOUS; SUBCUTANEOUS at 17:30

## 2025-06-20 RX ADMIN — OXYBUTYNIN CHLORIDE 2.5 MG: 5 TABLET ORAL at 20:55

## 2025-06-20 RX ADMIN — LEVOTHYROXINE SODIUM 150 MCG: 150 TABLET ORAL at 05:57

## 2025-06-20 RX ADMIN — AMOXICILLIN AND CLAVULANATE POTASSIUM 1 TABLET: 875; 125 TABLET, COATED ORAL at 20:58

## 2025-06-20 RX ADMIN — FERROUS SULFATE TAB 325 MG (65 MG ELEMENTAL FE) 1 TABLET: 325 (65 FE) TAB at 10:00

## 2025-06-20 RX ADMIN — VANCOMYCIN 1 G: 1 INJECTION, SOLUTION INTRAVENOUS at 10:38

## 2025-06-20 RX ADMIN — OXYBUTYNIN CHLORIDE 2.5 MG: 5 TABLET ORAL at 09:41

## 2025-06-20 RX ADMIN — POLYETHYLENE GLYCOL 3350 17 G: 17 POWDER, FOR SOLUTION ORAL at 09:43

## 2025-06-20 RX ADMIN — LIDOCAINE 4% 1 PATCH: 40 PATCH TOPICAL at 09:41

## 2025-06-20 RX ADMIN — DONEPEZIL HYDROCHLORIDE 5 MG: 5 TABLET, FILM COATED ORAL at 20:55

## 2025-06-20 RX ADMIN — AMIODARONE HYDROCHLORIDE 100 MG: 100 TABLET ORAL at 09:41

## 2025-06-20 RX ADMIN — ACETAMINOPHEN 650 MG: 325 TABLET ORAL at 17:30

## 2025-06-20 RX ADMIN — PANTOPRAZOLE SODIUM 40 MG: 40 TABLET, DELAYED RELEASE ORAL at 06:00

## 2025-06-20 RX ADMIN — HYDROCODONE BITARTRATE AND ACETAMINOPHEN 1 TABLET: 5; 325 TABLET ORAL at 23:10

## 2025-06-20 RX ADMIN — MELATONIN 6 MG: 3 TAB ORAL at 23:10

## 2025-06-20 RX ADMIN — PIPERACILLIN SODIUM AND TAZOBACTAM SODIUM 4.5 G: 4; .5 INJECTION, SOLUTION INTRAVENOUS at 05:57

## 2025-06-20 RX ADMIN — TRAMADOL HYDROCHLORIDE 50 MG: 50 TABLET, COATED ORAL at 14:40

## 2025-06-20 RX ADMIN — TRAMADOL HYDROCHLORIDE 50 MG: 50 TABLET, COATED ORAL at 20:56

## 2025-06-20 RX ADMIN — PIPERACILLIN SODIUM AND TAZOBACTAM SODIUM 4.5 G: 4; .5 INJECTION, SOLUTION INTRAVENOUS at 12:32

## 2025-06-20 RX ADMIN — HEPARIN SODIUM 5000 UNITS: 5000 INJECTION, SOLUTION INTRAVENOUS; SUBCUTANEOUS at 09:41

## 2025-06-20 RX ADMIN — ATORVASTATIN CALCIUM 20 MG: 20 TABLET, FILM COATED ORAL at 20:55

## 2025-06-20 ASSESSMENT — COGNITIVE AND FUNCTIONAL STATUS - GENERAL
MOBILITY SCORE: 12
MOVING FROM LYING ON BACK TO SITTING ON SIDE OF FLAT BED WITH BEDRAILS: A LOT
TOILETING: A LOT
MOVING TO AND FROM BED TO CHAIR: TOTAL
TURNING FROM BACK TO SIDE WHILE IN FLAT BAD: A LOT
MOVING FROM LYING ON BACK TO SITTING ON SIDE OF FLAT BED WITH BEDRAILS: A LOT
DAILY ACTIVITIY SCORE: 12
HELP NEEDED FOR BATHING: A LOT
PERSONAL GROOMING: A LOT
EATING MEALS: A LITTLE
EATING MEALS: A LOT
MOVING TO AND FROM BED TO CHAIR: A LOT
TURNING FROM BACK TO SIDE WHILE IN FLAT BAD: A LOT
HELP NEEDED FOR BATHING: A LOT
MOBILITY SCORE: 8
WALKING IN HOSPITAL ROOM: A LOT
DAILY ACTIVITIY SCORE: 13
STANDING UP FROM CHAIR USING ARMS: A LOT
DRESSING REGULAR LOWER BODY CLOTHING: TOTAL
PERSONAL GROOMING: A LITTLE
WALKING IN HOSPITAL ROOM: TOTAL
DRESSING REGULAR LOWER BODY CLOTHING: A LOT
STANDING UP FROM CHAIR USING ARMS: TOTAL
CLIMB 3 TO 5 STEPS WITH RAILING: TOTAL
TOILETING: TOTAL
DRESSING REGULAR UPPER BODY CLOTHING: A LITTLE
CLIMB 3 TO 5 STEPS WITH RAILING: A LOT
DRESSING REGULAR UPPER BODY CLOTHING: A LOT

## 2025-06-20 ASSESSMENT — PAIN SCALES - GENERAL
PAINLEVEL_OUTOF10: 0 - NO PAIN
PAINLEVEL_OUTOF10: 3
PAINLEVEL_OUTOF10: 7
PAINLEVEL_OUTOF10: 6
PAINLEVEL_OUTOF10: 0 - NO PAIN
PAINLEVEL_OUTOF10: 8

## 2025-06-20 ASSESSMENT — PAIN - FUNCTIONAL ASSESSMENT
PAIN_FUNCTIONAL_ASSESSMENT: 0-10
PAIN_FUNCTIONAL_ASSESSMENT: FLACC (FACE, LEGS, ACTIVITY, CRY, CONSOLABILITY)
PAIN_FUNCTIONAL_ASSESSMENT: 0-10

## 2025-06-20 ASSESSMENT — ACTIVITIES OF DAILY LIVING (ADL): HOME_MANAGEMENT_TIME_ENTRY: 18

## 2025-06-20 ASSESSMENT — ENCOUNTER SYMPTOMS
WOUND: 1
FEVER: 0
CHILLS: 0

## 2025-06-20 ASSESSMENT — PAIN DESCRIPTION - LOCATION: LOCATION: HIP

## 2025-06-20 ASSESSMENT — PAIN DESCRIPTION - DESCRIPTORS
DESCRIPTORS: ACHING

## 2025-06-20 ASSESSMENT — PAIN DESCRIPTION - ORIENTATION: ORIENTATION: LEFT

## 2025-06-20 NOTE — PROGRESS NOTES
Patient not medically clear. ID following, waiting for antibiotic recommendations. At this time the discharge plan is for patient to return home with [facility]. She will continue with aide services and will have an RN for wound care. Prior to discharge, ID plan needs to be known and [facility] will need to know if IV antibiotics are needed and we will need a start of care date. Will follow.      06/20/25 1446   Discharge Planning   Home or Post Acute Services In home services   Type of Home Care Services Home health aide;Home nursing visits   Expected Discharge Disposition Home Health  ([facility])   Does the patient need discharge transport arranged? Yes   RoundTrip coordination needed? Yes

## 2025-06-20 NOTE — NURSING NOTE
Per pharmacy the Celexa interacts with the Amiodarone and the Aricept to prolong the QTC, wanted clarification, Dr. Rich called and said it is ok to give the Celexa. Pharmacy informed.

## 2025-06-20 NOTE — PROGRESS NOTES
Vancomycin Dosing by Pharmacy- FOLLOW UP    Pati Frederick is a 79 y.o. year old female who Pharmacy has been consulted for vancomycin dosing for cellulitis, skin and soft tissue. Based on the patient's indication and renal status this patient is being dosed based on a goal AUC of 400-600.     Renal function is currently stable.    Current vancomycin dose: 1000 mg given every 24 hours    Estimated vancomycin AUC on current dose: 542 mg/L.hr  for new dose of Vancomycin 1000mg q24 hours  (dose was 1250mg q24 hours with a predicted level of 666mg/L) Patient has not received any doses of 1000mg yet. Will get first dose this morning    Visit Vitals  /78 (BP Location: Left arm, Patient Position: Lying)   Pulse 103   Temp 36.3 °C (97.3 °F) (Oral)   Resp 18        Lab Results   Component Value Date    CREATININE 0.98 2025    CREATININE 1.04 2025    CREATININE 0.85 2025    CREATININE 0.99 2025        Patient weight is as follows:   Vitals:    06/15/25 0714   Weight: 100 kg (220 lb 7.4 oz)       Cultures:  Susceptibility data for the encounter in last 14 days.  Collected Specimen Info Organism   25 Tissue from CLOT/THROMBUS Enterococcus faecalis        I/O last 3 completed shifts:  In: 1265 (12.7 mL/kg) [P.O.:1065; IV Piggyback:200]  Out: 1200 (12 mL/kg) [Urine:1200 (0.3 mL/kg/hr)]  Weight: 100 kg   I/O during current shift:  I/O this shift:  In: 120 [P.O.:120]  Out: 450 [Urine:450]    Temp (24hrs), Av.5 °C (97.7 °F), Min:36.3 °C (97.3 °F), Max:36.7 °C (98.1 °F)      Assessment/Plan    Above goal AUC. Orders placed for new vancomcyin regimen of 1000mg every 24 hours to begin at 1000.    This dosing regimen is predicted by [employer] to result in the following pharmacokinetic parameters:  Loading dose: N/A  Regimen: 1000 mg IV every 24 hours.  Start time: 09:49 on 2025  Exposure target: AUC24 (range) 400-600 mg/L.hr   HPL05-05: 550 mg/L.hr  AUC24,ss: 542 mg/L.hr  Probability of  AUC24 > 400: 100 %  Ctrough,ss: 16.6 mg/L  Probability of Ctrough,ss > 20: 10 %    The next level will be obtained on 6/21 at 0500. May be obtained sooner if clinically indicated.   Will continue to monitor renal function daily while on vancomycin and order serum creatinine at least every 48 hours if not already ordered.  Follow for continued vancomycin needs, clinical response, and signs/symptoms of toxicity.       Kayla Cao, RP

## 2025-06-20 NOTE — CONSULTS
Inpatient consult to Infectious Diseases  Consult performed by: Néstor Barr MD  Consult ordered by: ZAHEER Rodríguez-CNP            Primary MD: No Assigned PCP Generic Provider, MD    Reason For Consult  Infected right leg hematoma    History Of Present Illness  Pati Frederick is a 79 y.o. female presenting with chest pain and altered mental status.  She has history of dementia which limits her ability to provide comprehensive history.  She sustained trauma to her right leg about a week prior to presentation.  She was diagnosed with right leg hematoma.  She had evacuation of hematoma.  She had interval positive wound culture for Enterococcus faecalis.  She is on vancomycin and Zosyn.  She denies any right leg pain.  She denies any fever or chills.       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    Surgical History  She has a past surgical history that includes Colectomy (11/12/2015) and Hernia repair (11/12/2015).     Social History     Occupational History    Not on file   Tobacco Use    Smoking status: Never    Smokeless tobacco: Not on file   Substance and Sexual Activity    Alcohol use: Not on file    Drug use: Not on file    Sexual activity: Not on file     Travel History   Travel since 05/20/25    No documented travel since 05/20/25           Family History  Family History[1]  Allergies  Bactrim [sulfamethoxazole-trimethoprim], Cefepime, Clindamycin, Iodinated contrast media, Levaquin [levofloxacin], and Lunesta [eszopiclone]     Immunization History   Administered Date(s) Administered    [employer] SARS-CoV-2 Vaccination 06/22/2021    Moderna SARS-CoV-2 Vaccination 06/27/2022    Pfizer COVID-19 vaccine, bivalent, age 12 years and older (30 mcg/0.3 mL) 11/22/2022     Medications  Home medications:  Prescriptions Prior to Admission[2]  Current medications:  Scheduled medications  Scheduled  Medications[3]  Continuous medications  Continuous Medications[4]  PRN medications  PRN Medications[5]    Review of Systems   Constitutional:  Negative for chills and fever.   Cardiovascular:  Positive for leg swelling.   Skin:  Positive for rash and wound.        Objective  Range of Vitals (last 24 hours)  Heart Rate:  []   Temp:  [36.3 °C (97.3 °F)-36.7 °C (98.1 °F)]   Resp:  [18-20]   BP: (113-127)/(55-82)   SpO2:  [96 %-97 %]   Daily Weight  06/15/25 : 100 kg (220 lb 7.4 oz)    Body mass index is 36.69 kg/m².     Physical Exam  Constitutional:       Appearance: Normal appearance.   HENT:      Head: Normocephalic and atraumatic.      Right Ear: External ear normal.      Left Ear: External ear normal.      Nose: Nose normal.   Eyes:      General: No scleral icterus.     Extraocular Movements: Extraocular movements intact.      Conjunctiva/sclera: Conjunctivae normal.   Cardiovascular:      Rate and Rhythm: Normal rate and regular rhythm.      Heart sounds: Normal heart sounds.   Pulmonary:      Effort: Pulmonary effort is normal.      Breath sounds: Normal breath sounds.   Abdominal:      General: Bowel sounds are normal.      Palpations: Abdomen is soft.   Musculoskeletal:      Cervical back: Normal range of motion and neck supple.      Right lower leg: Edema present.      Left lower leg: No edema.   Skin:     General: Skin is warm and dry.   Neurological:      Mental Status: She is alert.   Psychiatric:         Behavior: Behavior normal.          Relevant Results  Outside Hospital Results    Labs  Results from last 72 hours   Lab Units 06/20/25  0815 06/19/25  1148 06/18/25  0452   WBC AUTO x10*3/uL 10.5 13.5* 10.0   HEMOGLOBIN g/dL 12.0 10.8* 11.5*   HEMATOCRIT % 36.9 34.3* 36.4   PLATELETS AUTO x10*3/uL 283 268 272     Results from last 72 hours   Lab Units 06/20/25  0815 06/19/25  1148 06/18/25  0452   SODIUM mmol/L 140 139 136   POTASSIUM mmol/L 4.4 3.9 4.8   CHLORIDE mmol/L 106 107 105   CO2 mmol/L 27  "25 23   BUN mg/dL 34* 36* 25*   CREATININE mg/dL 0.98 1.04 0.85   GLUCOSE mg/dL 90 99 125*   CALCIUM mg/dL 8.9 8.7 8.9   ANION GAP mmol/L 11 11 13   EGFR mL/min/1.73m*2 58* 54* 69         Estimated Creatinine Clearance: 52.7 mL/min (by C-G formula based on SCr of 0.98 mg/dL).  No results found for: \"CRP\", \"SEDRATE\"  No results found for: \"HIV1X2\", \"HIVCONF\", \"HCFVAJ3YN\"  No results found for: \"HEPCABINIT\", \"HEPCAB\", \"HCVPCRQUANT\"  Microbiology  Susceptibility data from last 90 days.  Collected Specimen Info Organism   06/17/25 Tissue from CLOT/THROMBUS Enterococcus faecalis       Imaging  XR abdomen 1 view  Result Date: 6/18/2025  Interpreted By:  Fátima Contreras, STUDY: XR ABDOMEN 1 VIEW 6/18/2025 3:38 pm   INDICATION: Signs/Symptoms:Follow-up constipation   COMPARISON: CT examination of the abdomen and pelvis from 06/17/2025   ACCESSION NUMBER(S): [id number]   ORDERING CLINICIAN: SUNIL DANIELS   TECHNIQUE: Recumbent abdominal view   FINDINGS: There is postoperative change from ventral hernia repair. Surgical clips are present in right upper quadrant and within the pelvis with bowel anastomosis identified in the pelvis as well. The bowel gas pattern is nonspecific. There is no rectal impaction seen.   Re-identified is a roughly 3 cm collection of air or gas within right side of the pelvis, most likely within uterine cavity.       Nonspecific bowel gas pattern. No rectal impaction or abnormal fecal loading within the colon is currently demonstrated.   3 cm collection of air/gas within right side of the pelvis most likely remaining within the uterine cavity.   Signed by: Fátima Contreras 6/18/2025 3:44 PM Dictation workstation:   [id number]    CT abdomen pelvis wo IV contrast  Result Date: 6/17/2025  Interpreted By:  Fátima Contreras, STUDY: CT ABDOMEN PELVIS WO IV CONTRAST;  6/17/2025 3:44 pm   INDICATION: Signs/Symptoms:Abdominal pain.     COMPARISON: None   ACCESSION NUMBER(S): [id number]   ORDERING CLINICIAN: SUNIL" VANIK   TECHNIQUE: CT of the abdomen and pelvis was performed. Contiguous axial images were obtained at 3 mm slice thickness through the abdomen and pelvis. Coronal and sagittal reconstructions at 3 mm slice thickness were performed.  No intravenous or oral contrast was administered.   FINDINGS: Please note that the evaluation of vessels, lymph nodes and organs is limited without intravenous contrast.   LOWER CHEST: There is some mild traction bronchiectasis within the posterior basal segment of each lower lobe. No pericardial or pleural abnormality is seen. The left hemidiaphragm is mildly elevated.   ABDOMEN:   LIVER: Mild hepatomegaly is seen with the liver measuring 18.1 cm in superior to inferior dimension. The density of the hepatic parenchyma is normal. No space-occupying hepatic lesion is seen.   BILE DUCTS: The intrahepatic and extrahepatic ducts are not dilated.   GALLBLADDER: The gallbladder is distended with transverse diameter of 5.3 cm. There is a small amount of hyperdense material layering along the dependent portion of the gallbladder lumen most likely representing small gallstones. No gallbladder wall thickening is evident.   PANCREAS: The pancreas appears unremarkable without evidence of ductal dilatation or masses.   SPLEEN: The spleen is normal in size without focal lesions.   ADRENAL GLANDS: There is a 2.4 cm hypodense right adrenal mass and a 1.3 cm hypodense left adrenal mass seen. The mean Hounsfield unit measurement of the right adrenal mass is a little less than 20 HU. This is most likely benign.   KIDNEYS AND URETERS: There are bilateral renal cysts noted with the largest cyst seen on the left measuring nearly 10 cm in greatest length. There are a number of right renal cysts noted with the largest of these measuring up to 5 cm in size. There is no hydronephrosis or nephrolithiasis of either kidney.   PELVIS:   BLADDER: The urinary bladder appears normal without abnormal wall thickening.    REPRODUCTIVE ORGANS: The uterus is within normal-sized limits but there is a large amount of gas identified within the uterine cavity. No adnexal mass is seen.   BOWEL: There is postoperative change from partial colonic resection with reanastomosis in the sigmoid region and additional colonic reanastomosis at the level of the transverse colon. There is a large amount of fecal loading seen. Small bowel is unremarkable in appearance as is the nondistended stomach.   VESSELS: Atherosclerosis of the abdominal aorta and iliac arteries is seen. There is no abdominal aortic aneurysm. There is also atherosclerosis of the celiac axis and splenic artery.   PERITONEUM/RETROPERITONEUM/LYMPH NODES: There is no free or loculated fluid collection, no free intraperitoneal air. No retroperitoneal hemorrhage is observed.   ABDOMINAL WALL: Postoperative change from ventral hernia repair is seen with mesh appearing intact.   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 There is a large Schmorl's node of the superior endplate of L5.       1.  Large amount of gas seen within the uterine cavity without uterine enlargement. This could be secondary to endometritis, post intrauterine instrumentation, or even abnormal fistulous connection to the gastrointestinal tract although no obvious fistula is seen. 2. Mild hepatomegaly. 3. Distended gallbladder with cholelithiasis. 4. Large amount of fecal burden within the colon. 5. Multiple right renal cysts with large left renal cyst. 6. Postoperative change from partial colonic resection and ventral hernia repair. 7. Bilateral adrenal masses, probably benign.     MACRO: None   Signed by: Fátima Contreras 6/17/2025 4:37 PM Dictation workstation:   [id number]    XR chest 1 view  Result Date: 6/17/2025  Interpreted By:  Fátima Contreras, STUDY: XR CHEST 1 VIEW 6/17/2025 2:51 pm   INDICATION: Signs/Symptoms:Dyspnea   COMPARISON: None available.   ACCESSION NUMBER(S): [id number]   ORDERING CLINICIAN:  SUNIL DANIELS   TECHNIQUE: AP erect view of the chest   FINDINGS: The cardiac size is at the upper limits of normal with calcified plaque seen in the aortic knob. There is mild pulmonary vascular congestion with cephalization of the pulmonary vasculature. No pleural abnormality is seen.       Borderline cardiomegaly with mild pulmonary vascular congestion.   Signed by: Fátima Contreras 6/17/2025 3:36 PM Dictation workstation:   [id number]    Electrocardiogram, 12-lead PRN ACS symptoms  Result Date: 6/17/2025  Atrial flutter Abnormal ECG When compared with ECG of 15-XIANG-2025 07:20, (unconfirmed) Atrial flutter has replaced Sinus rhythm Vent. rate has decreased BY  62 BPM ST no longer depressed in Inferior leads Inverted T waves have replaced nonspecific T wave abnormality in Inferior leads Nonspecific T wave abnormality no longer evident in Lateral leads    ECG 12 lead  Result Date: 6/17/2025  Sinus tachycardia Otherwise normal ECG No previous ECGs available Confirmed by Dane Cuevas ([id number]) on 6/17/2025 1:06:54 PM    CT tibia fibula right wo IV contrast  Result Date: 6/15/2025  Interpreted By:  Terri Hua, STUDY: CT of tibia and fibula without contrast.   INDICATION: Signs/Symptoms:Abscess vs hematoma to lateral edge with surrounding erythema   COMPARISON: None.   ACCESSION NUMBER(S): [id number]   ORDERING CLINICIAN: MYA BALDWIN   TECHNIQUE: Contiguous axial CT images were obtained at  2 mm slice thickness from the level of distal femur to the level of the foot without intravenous contrast administration. Coronal and sagittal reconstructions were performed.   FINDINGS: SOFT TISSUES:   An oval-shaped soft tissue attenuation structure visualized in the lateral subcutaneous soft tissues of the mid fibula, measuring 6.0 x 2.3 x 8.8 cm in AP, transverse, and craniocaudal dimensions respectively. This has average attenuation of approximately 50-55 Hounsfield units. In the posterior aspect of this, there is  a soft tissue defect noted. There is subcutaneous fluid infiltration of the posterior lower leg with mild skin thickening suggestive of cellulitis.   Muscles and tendons of the pelvis, thigh, and calf/lower leg are within normal limits without significant atrophy.   BONES: No acute fracture or malalignment.   JOINTS: Joint spaces of the knee and ankle are within normal limits without significant joint space loss or joint effusion.   MISCELLANEOUS: None.       1. Oval-shaped soft tissue attenuation structure in the lateral subcutaneous soft tissues of the mid fibula with soft tissue defect in the posterior aspect with attenuation of 50-55 Hounsfield units. Findings are favored to represent hematoma based on the attenuation. However, correlate with possible draining pus in this location to rule out abscess. 2. Subcutaneous fluid infiltration of the posterior lower leg with mild skin thickening suggesting cellulitis.   Signed by: Terri Hua 6/15/2025 10:14 AM Dictation workstation:   [id number]     Assessment/Plan   Encephalopathy, resolved  Infected right leg hematoma, positive wound culture for Enterococcus    Discontinue vancomycin  Discontinue Zosyn  Augmentin to complete total of 14 days  Local care right leg wound  Supportive care  Monitor temperature and WBC    This is a complex infectious disease issue and the following was performed today (for more details please see the above note): Management decisions reflecting the added complexity (e.g., changes in antimicrobial therapy, infection control strategies).      Néstor Barr MD         [1] No family history on file.  [2]   Medications Prior to Admission   Medication Sig Dispense Refill Last Dose/Taking    acetaminophen (Tylenol) 500 mg tablet Take 2 tablets (1,000 mg) by mouth 3 times a day.       atorvastatin (Lipitor) 20 mg tablet Take 1 tablet (20 mg) by mouth once daily at bedtime.       citalopram (CeleXA) 20 mg tablet Take 1 tablet (20 mg)  by mouth once daily.       diclofenac sodium (Voltaren) 1 % gel Apply 4.5 inches (4 g) topically 2 times a day as needed. Apply to left hip twice daily as needed for pain. Maximum 16 gram per day.       donepezil (Aricept) 5 mg tablet Take 1 tablet (5 mg) by mouth once daily at bedtime.       ferrous sulfate 325 mg (65 mg elemental) tablet Take 1 tablet by mouth once daily with breakfast.       hydrOXYzine pamoate (Vistaril) 25 mg capsule Take 1 capsule (25 mg) by mouth every 6 hours if needed for itching.       levothyroxine (Synthroid, Levoxyl) 150 mcg tablet Take 1 tablet (150 mcg) by mouth early in the morning.. Take on an empty stomach at the same time each day, either 30 to 60 minutes prior to breakfast       lidocaine (Lidoderm) 5 % patch Place 1 patch on the skin once daily at bedtime. Remove & discard patch within 12 hours or as directed by MD. Apply to left hip at bedtime for pain.       melatonin 5 mg tablet,chewable Chew 10 mg once daily at bedtime.       omeprazole OTC (PriLOSEC OTC) 20 mg EC tablet Take 1 tablet (20 mg) by mouth once daily in the morning. Take before meals. Do not crush, chew, or split.       oxyBUTYnin XL (Ditropan-XL) 5 mg 24 hr tablet Take 1 tablet (5 mg) by mouth once daily. Do not crush, chew, or split. Take at same time each day.       polyethylene glycol (Glycolax, Miralax) 17 gram packet Take 17 g by mouth once daily.       sennosides (Senokot) 8.6 mg tablet Take 1 tablet (8.6 mg) by mouth 2 times a day.       traMADol (Ultram) 50 mg tablet Take 1 tablet (50 mg) by mouth every 6 hours if needed for moderate pain (4 - 6).      [3] amiodarone, 100 mg, oral, Daily  [START ON 7/2/2025] apixaban, 5 mg, oral, BID  atorvastatin, 20 mg, oral, Nightly  [Held by provider] citalopram, 20 mg, oral, Daily  donepezil, 5 mg, oral, Nightly  ferrous sulfate, 1 tablet, oral, Daily with breakfast  heparin (porcine), 5,000 Units, subcutaneous, q8h  levothyroxine, 150 mcg, oral, Daily  lidocaine, 1  patch, transdermal, Daily  oxyBUTYnin, 2.5 mg, oral, BID  pantoprazole, 40 mg, oral, Daily before breakfast  piperacillin-tazobactam, 4.5 g, intravenous, q6h  polyethylene glycol, 17 g, oral, BID  vancomycin, 1 g, intravenous, q24h    [4]    [5] PRN medications: acetaminophen **OR** acetaminophen **OR** acetaminophen, benzocaine-menthol, dextromethorphan-guaifenesin, diclofenac sodium, guaiFENesin, HYDROcodone-acetaminophen, hydrOXYzine HCL, melatonin, ondansetron **OR** ondansetron, polyethylene glycol, traMADol, vancomycin

## 2025-06-20 NOTE — PROGRESS NOTES
Pati Frederick is a 79 y.o. female on day 5 of admission presenting with Cellulitis of right lower extremity.    Subjective   Patient seen and examined.  Resting in bed in no acute distress.  Awake alert oriented x 3.  Forgetful.  No new complaints.    Spoke with nursing bedside, no new issues.    Objective     Physical Exam  Vitals and nursing note reviewed.   Constitutional:       General: She is not in acute distress.     Appearance: Normal appearance. She is normal weight. She is not ill-appearing, toxic-appearing or diaphoretic.   HENT:      Head: Normocephalic and atraumatic.      Right Ear: External ear normal.      Left Ear: External ear normal.      Nose: Nose normal.      Mouth/Throat:      Mouth: Mucous membranes are moist.      Pharynx: Oropharynx is clear.   Eyes:      Extraocular Movements: Extraocular movements intact.      Conjunctiva/sclera: Conjunctivae normal.      Pupils: Pupils are equal, round, and reactive to light.   Cardiovascular:      Rate and Rhythm: Regular rhythm. Tachycardia present.      Pulses: Normal pulses.      Heart sounds: Normal heart sounds. No murmur heard.  Pulmonary:      Effort: Pulmonary effort is normal. No respiratory distress.      Breath sounds: Normal breath sounds. No wheezing, rhonchi or rales.      Comments: Room air.  Abdominal:      General: Bowel sounds are normal. There is no distension.      Palpations: Abdomen is soft.      Tenderness: There is no abdominal tenderness. There is no guarding.   Genitourinary:     Comments: Deferred.  Musculoskeletal:         General: Swelling present. Normal range of motion.      Cervical back: Normal range of motion and neck supple.      Right lower leg: Edema present.      Comments: Right lower extremity post-operative dressing clean dry intact.  No blood.   Skin:     General: Skin is warm and dry.      Capillary Refill: Capillary refill takes less than 2 seconds.      Comments: Right lower extremity post-operative dressing  "clean dry intact.  No blood.   Neurological:      General: No focal deficit present.      Mental Status: She is alert and oriented to person, place, and time.   Psychiatric:         Mood and Affect: Mood normal.         Behavior: Behavior normal.       Last Recorded Vitals  Blood pressure 117/75, pulse (!) 120, temperature 36.2 °C (97.2 °F), temperature source Oral, resp. rate 22, height 1.651 m (5' 5\"), weight 100 kg (220 lb 7.4 oz), SpO2 98%.    Intake/Output last 3 Shifts:  I/O last 3 completed shifts:  In: 1905 (19.1 mL/kg) [P.O.:1405; IV Piggyback:500]  Out: 2125 (21.3 mL/kg) [Urine:2125 (0.6 mL/kg/hr)]  Weight: 100 kg     Relevant Results  Results for orders placed or performed during the hospital encounter of 06/15/25 (from the past 24 hours)   CBC   Result Value Ref Range    WBC 10.5 4.4 - 11.3 x10*3/uL    nRBC 0.0 0.0 - 0.0 /100 WBCs    RBC 4.20 4.00 - 5.20 x10*6/uL    Hemoglobin 12.0 12.0 - 16.0 g/dL    Hematocrit 36.9 36.0 - 46.0 %    MCV 88 80 - 100 fL    MCH 28.6 26.0 - 34.0 pg    MCHC 32.5 32.0 - 36.0 g/dL    RDW 15.2 (H) 11.5 - 14.5 %    Platelets 283 150 - 450 x10*3/uL   Basic Metabolic Panel   Result Value Ref Range    Glucose 90 74 - 99 mg/dL    Sodium 140 136 - 145 mmol/L    Potassium 4.4 3.5 - 5.3 mmol/L    Chloride 106 98 - 107 mmol/L    Bicarbonate 27 21 - 32 mmol/L    Anion Gap 11 10 - 20 mmol/L    Urea Nitrogen 34 (H) 6 - 23 mg/dL    Creatinine 0.98 0.50 - 1.05 mg/dL    eGFR 58 (L) >60 mL/min/1.73m*2    Calcium 8.9 8.6 - 10.3 mg/dL   Vancomycin   Result Value Ref Range    Vancomycin 19.7 5.0 - 20.0 ug/mL     Susceptibility data from last 90 days.  Collected Specimen Info Organism Ampicillin Gentamicin Synergy Penicillin Trimethoprim/Sulfamethoxazole Vancomycin   06/17/25 Tissue from CLOT/THROMBUS Enterococcus faecalis  S  R  S  R  S     No results found.      Scheduled medications  Scheduled Medications[1]  Continuous medications  Continuous Medications[2]  PRN medications  PRN " Medications[3]      ASSESSMENT:  Right lower extremity injury / trauma  Right lower extremity cellulitis  Right lower extremity hematoma status post OR I&D, hematoma evacuation infection culture + Enterococcus  Normocytic anemia stable  Iron deficiency  Pyuria -   Leukocytosis resolved  Toxic encephalopathy  Dementia  Hypothyroidism  Overactive bladder  Left hip pain, chronic  Abdominal pain improved   Constipation - resolved  Atrial fibrillation / atrial flutter    PLAN:  Patient is doing well this morning.  No new issues.  Overall, condition is improved.  Stable status post right lower extremity I&D, hematoma evacuation - post-operative date #3.  Post-operative dressing per general surgery.  Stable per general surgery for discharge. Outpatient wound care and follow-up at the wound care center.  H&H stable.  No pain.  As needed analgesics.  Intra-operative wound culture pending Enterococcus.  Follow-up wound culture.  Continue IV antibiotics.  Infectious disease consultation.  Antibiotics per infectious disease.  Monitor.  Bowel regimen.  Avoid constipation.  Stable cardiac.  Medical management.  Amiodarone.  Oral anticoagulation Eliquis - start in 2 weeks.  Outpatient follow-up with Dr. Dukes in 2 weeks.  Mentation stable.  No focal deficits.  Suspect toxic, metabolic encephalopathy + underlying dementia - improved.  PT/OT.  Fall precautions.  Up with assistance only.  Bed and chair alarm at all times.  Pressure ulcer prevention measures. Turn and reposition every 2 hours and as needed.  Heels off bed.  Offloading.  DVT prophylaxis.  Heparin subcutaneous..  GI prophylaxis.  PPI Protonix.  Supportive care.  Patient reassured.  Case management following for discharge planning.  Discharge plan home with home health care when medically cleared.  Anticipate discharge after cleared infectious disease - when arrangements are made by case management.  Discussed with Dr. Rich.    ADDENDUM:  Intra-operative wound culture  Enterococcus faecalis, final.  Discussed with infectious disease, Dr. Barr, discharge plan on oral augmentin, total of 14 days.  Okay to discharge when home health care arrangements are made by case management.  Discussed with Dr. Rich.    Sofiya Marquez, APRN-CNP         [1] amiodarone, 100 mg, oral, Daily  amoxicillin-clavulanate, 1 tablet, oral, q12h [place]  [START ON 7/2/2025] apixaban, 5 mg, oral, BID  atorvastatin, 20 mg, oral, Nightly  citalopram, 20 mg, oral, Daily  donepezil, 5 mg, oral, Nightly  ferrous sulfate, 1 tablet, oral, Daily with breakfast  heparin (porcine), 5,000 Units, subcutaneous, q8h  levothyroxine, 150 mcg, oral, Daily  lidocaine, 1 patch, transdermal, Daily  oxyBUTYnin, 2.5 mg, oral, BID  pantoprazole, 40 mg, oral, Daily before breakfast  polyethylene glycol, 17 g, oral, BID     [2]    [3] PRN medications: acetaminophen **OR** acetaminophen **OR** acetaminophen, benzocaine-menthol, dextromethorphan-guaifenesin, diclofenac sodium, guaiFENesin, HYDROcodone-acetaminophen, hydrOXYzine HCL, melatonin, ondansetron **OR** ondansetron, polyethylene glycol, traMADol

## 2025-06-20 NOTE — CARE PLAN
The patient's goals for the shift include Iv antibiotics    The clinical goals for the shift include maintain safety, increase mobility      Problem: Safety - Adult  Goal: Free from fall injury  6/20/2025 1738 by Ramo Ochoa RN  Outcome: Progressing  6/20/2025 0817 by Ramo Ochoa RN  Flowsheets (Taken 6/16/2025 0950 by Melinda Machado RN)  Free from fall injury: Instruct family/caregiver on patient safety     Problem: Discharge Planning  Goal: Discharge to home or other facility with appropriate resources  6/20/2025 1738 by Ramo Ochoa RN  Outcome: Progressing  6/20/2025 0817 by Ramo Ochoa RN  Flowsheets (Taken 6/20/2025 0817)  Discharge to home or other facility with appropriate resources:   Identify barriers to discharge with patient and caregiver   Identify discharge learning needs (meds, wound care, etc)   Refer to discharge planning if patient needs post-hospital services based on physician order or complex needs related to functional status, cognitive ability or social support system   Arrange for needed discharge resources and transportation as appropriate     Problem: Chronic Conditions and Co-morbidities  Goal: Patient's chronic conditions and co-morbidity symptoms are monitored and maintained or improved  6/20/2025 1738 by Ramo Ochoa RN  Outcome: Progressing  6/20/2025 0817 by Ramo Ochoa RN  Flowsheets (Taken 6/20/2025 0817)  Care Plan - Patient's Chronic Conditions and Co-Morbidity Symptoms are Monitored and Maintained or Improved:   Monitor and assess patient's chronic conditions and comorbid symptoms for stability, deterioration, or improvement   Collaborate with multidisciplinary team to address chronic and comorbid conditions and prevent exacerbation or deterioration   Update acute care plan with appropriate goals if chronic or comorbid symptoms are exacerbated and prevent overall improvement and discharge     Problem: Nutrition  Goal: Nutrient intake appropriate for maintaining  nutritional needs  Outcome: Progressing     Problem: Skin  Goal: Decreased wound size/increased tissue granulation at next dressing change  6/20/2025 1738 by Ramo Ochoa RN  Outcome: Progressing  6/20/2025 0817 by Ramo Ochoa RN  Flowsheets (Taken 6/20/2025 0117 by Lupe Christensen)  Decreased wound size/increased tissue granulation at next dressing change:   Promote sleep for wound healing   Utilize specialty bed per algorithm  Goal: Participates in plan/prevention/treatment measures  6/20/2025 1738 by Ramo Ochoa RN  Outcome: Progressing  6/20/2025 0817 by Ramo Ochoa RN  Flowsheets (Taken 6/20/2025 0817)  Participates in plan/prevention/treatment measures:   Discuss with provider PT/OT consult   Elevate heels   Increase activity/out of bed for meals  Goal: Prevent/manage excess moisture  6/20/2025 1738 by Ramo Ochoa RN  Outcome: Progressing  6/20/2025 0817 by Ramo Ochoa RN  Flowsheets (Taken 6/20/2025 0117 by Lupe Christensen)  Prevent/manage excess moisture: Cleanse incontinence/protect with barrier cream  Goal: Prevent/minimize sheer/friction injuries  6/20/2025 1738 by Ramo Ochoa RN  Outcome: Progressing  6/20/2025 0817 by Ramo Ochoa RN  Flowsheets (Taken 6/17/2025 1119 by Misty Diana RN)  Prevent/minimize sheer/friction injuries:   HOB 30 degrees or less   Use pull sheet  Goal: Promote/optimize nutrition  6/20/2025 1738 by Ramo Ochoa RN  Outcome: Progressing  6/20/2025 0817 by Ramo Ochoa RN  Flowsheets (Taken 6/20/2025 0817)  Promote/optimize nutrition: Assist with feeding  Goal: Promote skin healing  6/20/2025 1738 by Ramo Ochoa RN  Outcome: Progressing  6/20/2025 0817 by Ramo Ochoa RN  Flowsheets (Taken 6/18/2025 1623 by Ladi Cagle RN)  Promote skin healing:   Turn/reposition every 2 hours/use positioning/transfer devices   Assess skin/pad under line(s)/device(s)   Ensure correct size (line/device) and apply per  instructions   Protective dressings  over bony prominences   Rotate device position/do not position patient on device     Problem: Fall/Injury  Goal: Not fall by end of shift  Outcome: Progressing  Goal: Be free from injury by end of the shift  Outcome: Progressing  Goal: Verbalize understanding of personal risk factors for fall in the hospital  Outcome: Progressing  Goal: Verbalize understanding of risk factor reduction measures to prevent injury from fall in the home  Outcome: Progressing  Goal: Use assistive devices by end of the shift  Outcome: Progressing  Goal: Pace activities to prevent fatigue by end of the shift  Outcome: Progressing     Problem: Pain  Goal: Takes deep breaths with improved pain control throughout the shift  Outcome: Progressing  Goal: Turns in bed with improved pain control throughout the shift  Outcome: Progressing  Goal: Walks with improved pain control throughout the shift  Outcome: Progressing  Goal: Performs ADL's with improved pain control throughout shift  Outcome: Progressing  Goal: Participates in PT with improved pain control throughout the shift  Outcome: Progressing  Goal: Free from opioid side effects throughout the shift  Outcome: Progressing  Goal: Free from acute confusion related to pain meds throughout the shift  Outcome: Progressing

## 2025-06-20 NOTE — PROGRESS NOTES
Physical Therapy    Physical Therapy Treatment    Patient Name: Pati Frederick  MRN: [id number]  Department: [address]  Room: [address] [place] [address]  Today's Date: 6/20/2025  Time Calculation  Start Time: 0816  Stop Time: 0840  Time Calculation (min): 24 min         Assessment/Plan   PT Assessment  Barriers to Discharge Home: Physical needs  Physical Needs: Returning to long-term care/other facility  End of Session Communication: Bedside nurse  Assessment Comment: Pt continues to present with mobility and strength deficits, pt would benefit from continued skilled therapy services to increase strength and improve functional mobility.  End of Session Patient Position: Bed, 3 rail up, Alarm on  PT Plan  Inpatient/Swing Bed or Outpatient: Inpatient  PT Plan  Treatment/Interventions: Bed mobility, Transfer training, Gait training, Strengthening, Therapeutic exercise, Therapeutic activity, Balance training  PT Plan: Ongoing PT  PT Frequency: 4 times per week  PT Discharge Recommendations: Moderate intensity level of continued care  Equipment Recommended upon Discharge: Lift  PT Recommended Transfer Status: Assist x2, Assistive device  PT - OK to Discharge: Yes    PT Visit Info:  PT Received On: 06/20/25     General Visit Information:   General  Prior to Session Communication: Bedside nurse  Patient Position Received: Bed, 3 rail up, Alarm on  General Comment: Cleared by nursing to be seen for therapy, pt agreeable with tx, supine in bed upon arrival.    Subjective   Precautions:  Precautions  Medical Precautions: Fall precautions    Objective   Pain:  Pain Assessment  Pain Assessment: 0-10  0-10 (Numeric) Pain Score: 3  Pain Type: Acute pain  Pain Location: Back  Pain Interventions: Repositioned  Response to Interventions: Resting quietly    Cognition:  Cognition  Overall Cognitive Status: Impaired at baseline  Orientation Level: Oriented X4     Postural Control:  Static Sitting Balance  Static Sitting-Balance Support: Bilateral upper  extremity supported, Feet supported  Static Sitting-Level of Assistance: Close supervision  Static Sitting-Comment/Number of Minutes: Good seated static balance.    Treatments:  Therapeutic Exercise  Therapeutic Exercise Performed: Yes  Therapeutic Exercise Activity 1: Bilateral ankle pumps to tolerance    Therapeutic Activity  Therapeutic Activity Performed: Yes  Therapeutic Activity 1: Seated EOB x15-20 minutes with good balance, pt able to maintain midline.    Bed Mobility  Bed Mobility: Yes  Bed Mobility 1  Bed Mobility 1: Supine to sitting  Level of Assistance 1: Maximum assistance, +2, Minimal verbal cues  Bed Mobility Comments 1: Max assist x2 for trunk/bilateral LE's during supine to sit, mod assist to scoot EOB with use of draw sheet.    Bed Mobility 2  Bed Mobility  2: Sitting to supine  Level of Assistance 2: Maximum assistance, Moderate verbal cues, +2  Bed Mobility Comments 2: Max assist x2 for trunk/bilateral LE's during sit to supine, dependent to boost HOB with use of draw sheet.    Bed Mobility 3  Bed Mobility 3: Rolling right, Rolling left  Level of Assistance 3: Moderate assistance  Bed Mobility Comments 3: Mod assist for rolling R/L    Outcome Measures:  [facility] Basic Mobility  Turning from your back to your side while in a flat bed without using bedrails: A lot  Moving from lying on your back to sitting on the side of a flat bed without using bedrails: A lot  Moving to and from bed to chair (including a wheelchair): Total  Standing up from a chair using your arms (e.g. wheelchair or bedside chair): Total  To walk in hospital room: Total  Climbing 3-5 steps with railing: Total  Basic Mobility - Total Score: 8    Education Documentation  Mobility Training, taught by Brenda Gonzalez PTA at 6/20/2025  9:21 AM.  Learner: Patient  Readiness: Acceptance  Method: Explanation  Response: Verbalizes Understanding    Education Comments  06/20/25 0922 Brenda Gonzalez PTA  Patient educated on the importance of  mobility and participation with therapy. Educated on safety and use of call light for assistance.           OP EDUCATION:       Encounter Problems       Encounter Problems (Active)       PT Problem       Pt will transition supine<>sit using hospital bed with close S and verbal cues. (Not met)       Start:  06/16/25    Expected End:  07/02/25    Resolved:  06/18/25    Updated to: Pt will transition supine<>sit using hospital bed with modA x2.    Update reason: new data in EMR         Pt will transfer sit<>stand with FWW & Alejandra x1. (Not met)       Start:  06/16/25    Expected End:  07/02/25    Resolved:  06/18/25    Updated to: Pt will transfer bed<>WC using LRAD & maxA x2.    Update reason: new data in EMR         Pt will ambulate >/=10 ft with FWW & CGA x1. (Not met)       Start:  06/16/25    Expected End:  07/02/25    Resolved:  06/18/25    Updated to: Pt will tolerate >/= 20 minutes seated EOB engaging in BLE therex and multiplanar reaching with BUE with </= 4 rest breaks, close S & consistent cues.    Update reason: new data in EMR         Pt will transition supine<>sit using hospital bed with modA x2. (Progressing)       Start:  06/18/25    Expected End:  07/02/25                Pt will tolerate >/= 20 minutes seated EOB engaging in BLE therex and multiplanar reaching with BUE with </= 4 rest breaks, close S & consistent cues. (Progressing)       Start:  06/18/25    Expected End:  07/02/25                Pt will transfer bed<>WC using LRAD & maxA x2. (Not Progressing)       Start:  06/18/25    Expected End:  07/02/25

## 2025-06-20 NOTE — CARE PLAN
The patient's goals for the shift include Iv antibiotics    Problem: Skin  Goal: Decreased wound size/increased tissue granulation at next dressing change  Outcome: Progressing     Problem: Skin  Goal: Promote skin healing  Outcome: Progressing     Problem: Fall/Injury  Goal: Be free from injury by end of the shift  Outcome: Progressing

## 2025-06-20 NOTE — PROGRESS NOTES
Occupational Therapy    OT Treatment    Patient Name: Pati Frederick  MRN: [id number]  Department: [address]  Room: [address] [place] [address]  Today's Date: 6/20/2025  Time Calculation  Start Time: 0755  Stop Time: 0824  Time Calculation (min): 29 min        Assessment:  OT Assessment: tolerated session well, demonstrating progression towards POC with pt appearing much more alert and engaged this date. Pt would benefit from continued skilled OT services to improve strength, balance, and functional tolerance to increase independence with ADL tasks  Prognosis: Good  Barriers to Discharge Home: No anticipated barriers  Evaluation/Treatment Tolerance: Patient limited by fatigue, Patient limited by pain  End of Session Communication: Bedside nurse  End of Session Patient Position: Bed, 3 rail up, Alarm on  OT Assessment Results: Decreased ADL status, Decreased upper extremity strength, Decreased cognition, Decreased endurance, Decreased functional mobility  Prognosis: Good  Evaluation/Treatment Tolerance: Patient limited by fatigue, Patient limited by pain  Plan:  Treatment Interventions: ADL retraining, Functional transfer training, UE strengthening/ROM, Endurance training, Patient/family training, Equipment evaluation/education, Compensatory technique education  OT Frequency: 3 times per week  OT Discharge Recommendations: Moderate intensity level of continued care  Equipment Recommended upon Discharge: Lift  OT Recommended Transfer Status: Assist of 2  OT - OK to Discharge: Yes  Treatment Interventions: ADL retraining, Functional transfer training, UE strengthening/ROM, Endurance training, Patient/family training, Equipment evaluation/education, Compensatory technique education    Subjective   OT Visit Info:  OT Received On: 06/20/25  General Visit Info:  General  Reason for Referral: Impaired ADLs, impaired mobility d/t RLE cellulitis  Prior to Session Communication: Bedside nurse  Patient Position Received: Bed, 3 rail up, Alarm  on  General Comment: Cleared for therapy per RN. Pt supine in bed upon arrival and agreeable to tx  Precautions:  Hearing/Visual Limitations: hearing intact; corrective lenses (donned)  Medical Precautions: Fall precautions    Pain:  Pain Assessment  Pain Assessment: FLACC (Face, Legs, Activity, Cry, Consolability)  0-10 (Numeric) Pain Score: 3  Pain Type: Acute pain  Pain Location: Back  Response to Interventions: Resting quietly    Objective    Cognition:  Cognition  Orientation Level: Oriented X4 (h/o dementia)  Coordination:  Movements are Fluid and Coordinated: No  Upper Body Coordination: delayed rate + accuracy of BUE movements  Activities of Daily Living: Feeding  Feeding Level of Assistance: Setup, Close supervision  Feeding Where Assessed: Edge of bed  Feeding Comments: s/u for feeding task seated EOB    Grooming  Grooming Where Assessed: Edge of bed  Grooming Comments: assist to brush hair d/t increased knotting    LE Dressing  LE Dressing: Yes  Sock Level of Assistance: Dependent  LE Dressing Where Assessed: Edge of bed  LE Dressing Comments: dependent assist to don socks    Bed Mobility/Transfers: Bed Mobility  Bed Mobility: Yes  Bed Mobility 1  Bed Mobility 1: Supine to sitting, Sitting to supine  Level of Assistance 1: Maximum assistance, +2, Minimal verbal cues  Bed Mobility Comments 1: assist to bring BLE on/off bed and for trunk elevation with cues for use of bedrail for UE support and to scoot hips to EOB. Dependent boost to HOB with use of drawsheet  Bed Mobility 2  Bed Mobility  2: Rolling right, Rolling left  Level of Assistance 2: Moderate assistance  Bed Mobility Comments 2: assist to roll L/R for linen adjustment    Transfers  Transfer: No (declining transfer task this date)    Sitting Balance:  Dynamic Sitting Balance  Dynamic Sitting-Level of Assistance: Close supervision  Dynamic Sitting-Balance: Forward lean, Reaching for objects, Reaching across midline  Dynamic Sitting-Comments: fair+  seated balance during feeding task  Therapy/Activity: Therapeutic Activity  Therapeutic Activity Performed: Yes  Therapeutic Activity 1: tolerated sitting EOB ~16 min during feeding task    Outcome Measures:[facility] Daily Activity  Putting on and taking off regular lower body clothing: Total  Bathing (including washing, rinsing, drying): A lot  Putting on and taking off regular upper body clothing: A little  Toileting, which includes using toilet, bedpan or urinal: Total  Taking care of personal grooming such as brushing teeth: A little  Eating Meals: A little  Daily Activity - Total Score: 13    Education Documentation  Handouts, taught by RAMON Singh at 6/20/2025  8:35 AM.  Learner: Patient  Readiness: Acceptance  Method: Explanation  Response: Verbalizes Understanding, Needs Reinforcement  Comment: educated on safety awareness    Body Mechanics, taught by RAMON Singh at 6/20/2025  8:35 AM.  Learner: Patient  Readiness: Acceptance  Method: Explanation  Response: Verbalizes Understanding, Needs Reinforcement  Comment: educated on safety awareness    Precautions, taught by RAMON Singh at 6/20/2025  8:35 AM.  Learner: Patient  Readiness: Acceptance  Method: Explanation  Response: Verbalizes Understanding, Needs Reinforcement  Comment: educated on safety awareness    Home Exercise Program, taught by RAMON Singh at 6/20/2025  8:35 AM.  Learner: Patient  Readiness: Acceptance  Method: Explanation  Response: Verbalizes Understanding, Needs Reinforcement  Comment: educated on safety awareness    ADL Training, taught by RAMON Singh at 6/20/2025  8:35 AM.  Learner: Patient  Readiness: Acceptance  Method: Explanation  Response: Verbalizes Understanding, Needs Reinforcement  Comment: educated on safety awareness    Education Comments  No comments found.      Problem: OT Goals  Goal: ADLs  Description: Patient will complete ADL tasks, with minimal  assist  using AE need in order to increase patient's safety and independence with self-care tasks.    Outcome: Progressing     Problem: OT Goals  Goal: Activity tolerance  Description: Patient will demonstrate the ability to participate in functional activity at least >/= 25 minutes in order to increase patient's safety and independence with daily tasks.    Outcome: Progressing     Problem: OT Goals  Goal: Functional transfers  Description: Patient will complete functional transfers with moderate assist using least restrictive device, in order to increase patient's safety and independence with daily tasks.    Outcome: Not Progressing

## 2025-06-20 NOTE — PROGRESS NOTES
Vancomycin Dosing by Pharmacy- Cessation of Therapy    Consult to pharmacy for vancomycin dosing has been discontinued by the prescriber, pharmacy will sign off at this time.    Please call pharmacy if there are further questions or re-enter a consult if vancomycin is resumed.     Kayla Cao, [facility]

## 2025-06-21 ENCOUNTER — PHARMACY VISIT (OUTPATIENT)
Dept: PHARMACY | Facility: CLINIC | Age: 80
End: 2025-06-21
Payer: MEDICAID

## 2025-06-21 VITALS
SYSTOLIC BLOOD PRESSURE: 114 MMHG | TEMPERATURE: 97.7 F | BODY MASS INDEX: 36.73 KG/M2 | WEIGHT: 220.46 LBS | DIASTOLIC BLOOD PRESSURE: 75 MMHG | HEIGHT: 65 IN | RESPIRATION RATE: 18 BRPM | OXYGEN SATURATION: 98 % | HEART RATE: 63 BPM

## 2025-06-21 LAB
ANION GAP SERPL CALCULATED.3IONS-SCNC: 10 MMOL/L (ref 10–20)
BUN SERPL-MCNC: 34 MG/DL (ref 6–23)
CALCIUM SERPL-MCNC: 8.9 MG/DL (ref 8.6–10.3)
CHLORIDE SERPL-SCNC: 107 MMOL/L (ref 98–107)
CO2 SERPL-SCNC: 27 MMOL/L (ref 21–32)
CREAT SERPL-MCNC: 0.88 MG/DL (ref 0.5–1.05)
EGFRCR SERPLBLD CKD-EPI 2021: 66 ML/MIN/1.73M*2
ERYTHROCYTE [DISTWIDTH] IN BLOOD BY AUTOMATED COUNT: 14.9 % (ref 11.5–14.5)
GLUCOSE SERPL-MCNC: 94 MG/DL (ref 74–99)
HCT VFR BLD AUTO: 34.8 % (ref 36–46)
HGB BLD-MCNC: 11.3 G/DL (ref 12–16)
MCH RBC QN AUTO: 28.4 PG (ref 26–34)
MCHC RBC AUTO-ENTMCNC: 32.5 G/DL (ref 32–36)
MCV RBC AUTO: 87 FL (ref 80–100)
NRBC BLD-RTO: 0 /100 WBCS (ref 0–0)
PLATELET # BLD AUTO: 245 X10*3/UL (ref 150–450)
POTASSIUM SERPL-SCNC: 4.6 MMOL/L (ref 3.5–5.3)
RBC # BLD AUTO: 3.98 X10*6/UL (ref 4–5.2)
SODIUM SERPL-SCNC: 139 MMOL/L (ref 136–145)
WBC # BLD AUTO: 9.9 X10*3/UL (ref 4.4–11.3)

## 2025-06-21 PROCEDURE — 82374 ASSAY BLOOD CARBON DIOXIDE: CPT | Performed by: STUDENT IN AN ORGANIZED HEALTH CARE EDUCATION/TRAINING PROGRAM

## 2025-06-21 PROCEDURE — RXMED WILLOW AMBULATORY MEDICATION CHARGE

## 2025-06-21 PROCEDURE — 2500000001 HC RX 250 WO HCPCS SELF ADMINISTERED DRUGS (ALT 637 FOR MEDICARE OP): Performed by: INTERNAL MEDICINE

## 2025-06-21 PROCEDURE — 2500000002 HC RX 250 W HCPCS SELF ADMINISTERED DRUGS (ALT 637 FOR MEDICARE OP, ALT 636 FOR OP/ED): Performed by: STUDENT IN AN ORGANIZED HEALTH CARE EDUCATION/TRAINING PROGRAM

## 2025-06-21 PROCEDURE — 2500000001 HC RX 250 WO HCPCS SELF ADMINISTERED DRUGS (ALT 637 FOR MEDICARE OP): Performed by: NURSE PRACTITIONER

## 2025-06-21 PROCEDURE — 2500000005 HC RX 250 GENERAL PHARMACY W/O HCPCS: Performed by: STUDENT IN AN ORGANIZED HEALTH CARE EDUCATION/TRAINING PROGRAM

## 2025-06-21 PROCEDURE — 85027 COMPLETE CBC AUTOMATED: CPT | Performed by: STUDENT IN AN ORGANIZED HEALTH CARE EDUCATION/TRAINING PROGRAM

## 2025-06-21 PROCEDURE — 2500000004 HC RX 250 GENERAL PHARMACY W/ HCPCS (ALT 636 FOR OP/ED): Performed by: STUDENT IN AN ORGANIZED HEALTH CARE EDUCATION/TRAINING PROGRAM

## 2025-06-21 PROCEDURE — 2500000001 HC RX 250 WO HCPCS SELF ADMINISTERED DRUGS (ALT 637 FOR MEDICARE OP): Performed by: STUDENT IN AN ORGANIZED HEALTH CARE EDUCATION/TRAINING PROGRAM

## 2025-06-21 PROCEDURE — 36415 COLL VENOUS BLD VENIPUNCTURE: CPT | Performed by: STUDENT IN AN ORGANIZED HEALTH CARE EDUCATION/TRAINING PROGRAM

## 2025-06-21 RX ORDER — AMIODARONE HYDROCHLORIDE 100 MG/1
100 TABLET ORAL DAILY
Qty: 30 TABLET | Refills: 0 | Status: SHIPPED | OUTPATIENT
Start: 2025-06-21

## 2025-06-21 RX ORDER — AMOXICILLIN AND CLAVULANATE POTASSIUM 875; 125 MG/1; MG/1
1 TABLET, FILM COATED ORAL EVERY 12 HOURS SCHEDULED
Qty: 18 TABLET | Refills: 0 | Status: SHIPPED | OUTPATIENT
Start: 2025-06-21

## 2025-06-21 RX ORDER — MELOXICAM 7.5 MG/1
7.5 TABLET ORAL DAILY
Qty: 30 TABLET | Refills: 0 | Status: SHIPPED | OUTPATIENT
Start: 2025-06-21 | End: 2025-07-21

## 2025-06-21 RX ADMIN — HEPARIN SODIUM 5000 UNITS: 5000 INJECTION, SOLUTION INTRAVENOUS; SUBCUTANEOUS at 02:15

## 2025-06-21 RX ADMIN — FERROUS SULFATE TAB 325 MG (65 MG ELEMENTAL FE) 1 TABLET: 325 (65 FE) TAB at 07:58

## 2025-06-21 RX ADMIN — OXYBUTYNIN CHLORIDE 2.5 MG: 5 TABLET ORAL at 07:58

## 2025-06-21 RX ADMIN — HEPARIN SODIUM 5000 UNITS: 5000 INJECTION, SOLUTION INTRAVENOUS; SUBCUTANEOUS at 07:59

## 2025-06-21 RX ADMIN — AMIODARONE HYDROCHLORIDE 100 MG: 100 TABLET ORAL at 07:58

## 2025-06-21 RX ADMIN — LEVOTHYROXINE SODIUM 150 MCG: 150 TABLET ORAL at 06:11

## 2025-06-21 RX ADMIN — TRAMADOL HYDROCHLORIDE 50 MG: 50 TABLET, COATED ORAL at 12:49

## 2025-06-21 RX ADMIN — ACETAMINOPHEN 650 MG: 325 TABLET ORAL at 09:30

## 2025-06-21 RX ADMIN — PANTOPRAZOLE SODIUM 40 MG: 40 TABLET, DELAYED RELEASE ORAL at 06:10

## 2025-06-21 RX ADMIN — AMOXICILLIN AND CLAVULANATE POTASSIUM 1 TABLET: 875; 125 TABLET, COATED ORAL at 07:59

## 2025-06-21 RX ADMIN — CITALOPRAM HYDROBROMIDE 20 MG: 20 TABLET ORAL at 07:59

## 2025-06-21 RX ADMIN — LIDOCAINE 4% 1 PATCH: 40 PATCH TOPICAL at 07:59

## 2025-06-21 ASSESSMENT — PAIN SCALES - GENERAL
PAINLEVEL_OUTOF10: 3
PAINLEVEL_OUTOF10: 2
PAINLEVEL_OUTOF10: 3
PAINLEVEL_OUTOF10: 6
PAINLEVEL_OUTOF10: 0 - NO PAIN
PAINLEVEL_OUTOF10: 3

## 2025-06-21 ASSESSMENT — PAIN - FUNCTIONAL ASSESSMENT
PAIN_FUNCTIONAL_ASSESSMENT: 0-10

## 2025-06-21 ASSESSMENT — COGNITIVE AND FUNCTIONAL STATUS - GENERAL
PERSONAL GROOMING: A LOT
EATING MEALS: A LOT
TOILETING: A LOT
WALKING IN HOSPITAL ROOM: A LOT
DRESSING REGULAR LOWER BODY CLOTHING: A LOT
STANDING UP FROM CHAIR USING ARMS: A LOT
MOVING TO AND FROM BED TO CHAIR: A LOT
CLIMB 3 TO 5 STEPS WITH RAILING: A LOT
MOVING FROM LYING ON BACK TO SITTING ON SIDE OF FLAT BED WITH BEDRAILS: A LOT
HELP NEEDED FOR BATHING: A LOT
MOBILITY SCORE: 12
DRESSING REGULAR UPPER BODY CLOTHING: A LOT
TURNING FROM BACK TO SIDE WHILE IN FLAT BAD: A LOT
DAILY ACTIVITIY SCORE: 12

## 2025-06-21 ASSESSMENT — PAIN DESCRIPTION - DESCRIPTORS
DESCRIPTORS: ACHING
DESCRIPTORS: ACHING

## 2025-06-21 NOTE — DISCHARGE SUMMARY
Discharge Diagnosis  Cellulitis of right lower extremity           Issues Requiring Follow-Up  Right lower extremity injury / trauma  Right lower extremity cellulitis  Right lower extremity hematoma status post OR I&D, hematoma evacuation infection culture + Enterococcus  Normocytic anemia stable  Iron deficiency  Pyuria -   Leukocytosis resolved  Toxic encephalopathy  Dementia  Hypothyroidism  Overactive bladder  Left hip pain, chronic  Abdominal pain improved   Constipation - resolved  Atrial fibrillation / atrial flutte    Discharge Meds     Medication List      START taking these medications     amiodarone 100 mg tablet; Commonly known as: Pacerone; Take 1 tablet   (100 mg) by mouth once daily.   amoxicillin-clavulanate 875-125 mg tablet; Commonly known as: Augmentin;   Take 1 tablet by mouth every 12 hours.   Eliquis 5 mg tablet; Generic drug: apixaban; Take 1 tablet (5 mg) by   mouth 2 times a day. Start on June 28th, 2025 or as directed by provider;   Start taking on: July 2, 2025   meloxicam 7.5 mg tablet; Commonly known as: Mobic; Take 1 tablet (7.5   mg) by mouth once daily.     CONTINUE taking these medications     acetaminophen 500 mg tablet; Commonly known as: Tylenol   atorvastatin 20 mg tablet; Commonly known as: Lipitor   citalopram 20 mg tablet; Commonly known as: CeleXA   diclofenac sodium 1 % gel; Commonly known as: Voltaren   donepezil 5 mg tablet; Commonly known as: Aricept   ferrous sulfate 325 mg (65 mg elemental) tablet   hydrOXYzine pamoate 25 mg capsule; Commonly known as: Vistaril   levothyroxine 150 mcg tablet; Commonly known as: Synthroid, Levoxyl   lidocaine 5 % patch; Commonly known as: Lidoderm   melatonin 5 mg tablet,chewable   omeprazole OTC 20 mg EC tablet; Commonly known as: PriLOSEC OTC   oxyBUTYnin XL 5 mg 24 hr tablet; Commonly known as: Ditropan-XL   polyethylene glycol 17 gram packet; Commonly known as: Glycolax, Miralax   sennosides 8.6 mg tablet; Commonly known as:  Senokot   traMADol 50 mg tablet; Commonly known as: Ultram       Test Results Pending At Discharge  Pending Labs       No current pending labs.            Hospital Course   The patient was admitted with a complaint of right lower extremity swelling and redness.  Patient had a left lower extremity cellulitis and hematoma.  Hematoma was evacuated.  Culture grew Enterococcus.  Patient was treated with a IV antibiotics initially then switched over to oral Augmentin.  The patient is being discharged home with a home health care per family request.    Pertinent Physical Exam At Time of Discharge  Physical Exam    Outpatient Follow-Up  No future appointments.      Don Rich MD

## 2025-06-21 NOTE — CARE PLAN
The patient's goals for the shift include   Problem: Skin  Goal: Decreased wound size/increased tissue granulation at next dressing change  6/20/2025 2236 by Lupe Christensen  Outcome: Progressing  6/20/2025 2235 by Lupe Christensen  Outcome: Progressing     Problem: Skin  Goal: Participates in plan/prevention/treatment measures  6/20/2025 2236 by Lupe Christensen  Outcome: Progressing  6/20/2025 2235 by Lupe Christensen  Outcome: Progressing     Problem: Skin  Goal: Prevent/manage excess moisture  6/20/2025 2236 by Lupe Christensen  Outcome: Progressing  6/20/2025 2235 by Lupe Christensen  Outcome: Progressing     Problem: Skin  Goal: Prevent/minimize sheer/friction injuries  6/20/2025 2236 by Lupe Christensen  Outcome: Progressing  6/20/2025 2235 by Lupe Christensen  Outcome: Progressing     Problem: Skin  Goal: Promote skin healing  6/20/2025 2236 by Lupe Christensen  Outcome: Progressing  6/20/2025 2235 by Lupe Christensen  Outcome: Progressing

## 2025-06-21 NOTE — PROGRESS NOTES
Pati Frederick is a 79 y.o. female on day 6 of admission presenting with Cellulitis of right lower extremity.    Subjective   The patient was seen and examined.  Lying in the bed.  Comfortable.  Did not look in [place].  The patient is complaining of some pain in the back.       Objective     Physical Exam  HEENT:  Head externally atraumatic,  extraocular movements intact, oral mucosa moist  Neck:  Supple, no JVP, no palpable adenopathy or thyromegaly.  No carotid bruit.  Chest:  Clear to auscultation and resonant.  Heart:  Regular rate and rhythm, no murmur or gallop could be appreciated.  Abdomen:  Soft, nontender, bowel sounds present, normoactive, no palpable hepatosplenomegaly.  Extremities:  No edema, pulses present, no cyanosis or clubbing.  CNS:  Patient alert, oriented to time, place and person.    No new deficit.  Cranial nerves 2-12 grossly intact  Skin:  No active rash.  Musculoskeletal:  No  apparent joint swelling or erythema, range of movement normal.  Last Recorded Vitals  Heart Rate:  []   Temp:  [36.2 °C (97.2 °F)-36.5 °C (97.7 °F)]   Resp:  [17-22]   BP: (114-120)/(73-84)   SpO2:  [98 %]     Intake/Output last 3 Shifts:  I/O last 3 completed shifts:  In: 1080 (10.8 mL/kg) [P.O.:580; IV Piggyback:500]  Out: 1525 (15.3 mL/kg) [Urine:1525 (0.4 mL/kg/hr)]  Weight: 100 kg     Relevant Results  Susceptibility data from last 90 days.  Collected Specimen Info Organism Ampicillin Gentamicin Synergy Penicillin Trimethoprim/Sulfamethoxazole Vancomycin   06/17/25 Tissue from CLOT/THROMBUS Enterococcus faecalis  S  R  S  R  S     Results for orders placed or performed during the hospital encounter of 06/15/25 (from the past 24 hours)   CBC   Result Value Ref Range    WBC 9.9 4.4 - 11.3 x10*3/uL    nRBC 0.0 0.0 - 0.0 /100 WBCs    RBC 3.98 (L) 4.00 - 5.20 x10*6/uL    Hemoglobin 11.3 (L) 12.0 - 16.0 g/dL    Hematocrit 34.8 (L) 36.0 - 46.0 %    MCV 87 80 - 100 fL    MCH 28.4 26.0 - 34.0 pg    MCHC 32.5 32.0  - 36.0 g/dL    RDW 14.9 (H) 11.5 - 14.5 %    Platelets 245 150 - 450 x10*3/uL   Basic Metabolic Panel   Result Value Ref Range    Glucose 94 74 - 99 mg/dL    Sodium 139 136 - 145 mmol/L    Potassium 4.6 3.5 - 5.3 mmol/L    Chloride 107 98 - 107 mmol/L    Bicarbonate 27 21 - 32 mmol/L    Anion Gap 10 10 - 20 mmol/L    Urea Nitrogen 34 (H) 6 - 23 mg/dL    Creatinine 0.88 0.50 - 1.05 mg/dL    eGFR 66 >60 mL/min/1.73m*2    Calcium 8.9 8.6 - 10.3 mg/dL      Current Medications[1]   Assessment/Plan   Assessment & Plan  Cellulitis of right lower extremity    Hypothyroidism    Dementia    Atrial flutter (Multi)    Hematoma of right lower extremity  Right lower extremity injury / trauma  Right lower extremity hematoma status post OR I&D, hematoma evacuation infection culture + Enterococcus  Normocytic anemia stable  Iron deficiency  Pyuria -   Leukocytosis resolved  Toxic encephalopathy  Dementia  Hypothyroidism  Overactive bladder  Left hip pain, chronic  Abdominal pain improved   Constipation - resolved  Atrial fibrillation / atrial flutte    Plan: Continue current medication.  Supportive care.  The patient has been cleared by the consultants to go home.  Patient is off and on A-fib with RVR.  Heart rate is fairly controlled at this time.  The patient is being discharged home in a stable condition with a home health care and home PT/OT per family request.           Don Rich MD          [1]   Current Facility-Administered Medications:     acetaminophen (Tylenol) tablet 650 mg, 650 mg, oral, q4h PRN, 650 mg at 06/21/25 0930 **OR** acetaminophen (Tylenol) oral liquid 650 mg, 650 mg, oral, q4h PRN **OR** acetaminophen (Tylenol) suppository 650 mg, 650 mg, rectal, q4h PRN, Elba Loredo MD    amiodarone (Pacerone) tablet 100 mg, 100 mg, oral, Daily, Prabhjot Mariee, APRN-CNP, 100 mg at 06/21/25 0758    amoxicillin-clavulanate (Augmentin) 875-125 mg per tablet 1 tablet, 1 tablet, oral, q12h Néstor COLEMAN  MD Cortney, 1 tablet at 06/21/25 0759    [START ON 7/2/2025] apixaban (Eliquis) tablet 5 mg, 5 mg, oral, BID, MINDY Nagy    atorvastatin (Lipitor) tablet 20 mg, 20 mg, oral, Nightly, MINDY Rodríguez, 20 mg at 06/20/25 2055    benzocaine-menthol (Cepastat Sore Throat) lozenge 1 lozenge, 1 lozenge, Mouth/Throat, q2h PRN, Elba Loredo MD    citalopram (CeleXA) tablet 20 mg, 20 mg, oral, Daily, MINDY Rodríguez, 20 mg at 06/21/25 0759    dextromethorphan-guaifenesin (Robitussin DM)  mg/5 mL oral liquid 5 mL, 5 mL, oral, q4h PRN, Elba Loredo MD    diclofenac sodium (Voltaren) 1 % gel 4 g, 4 g, Topical, BID PRN, MINDY Rodríguez    donepezil (Aricept) tablet 5 mg, 5 mg, oral, Nightly, Elba Loredo MD, 5 mg at 06/20/25 2055    ferrous sulfate 325 mg (65 mg elemental) tablet 1 tablet, 1 tablet, oral, Daily with breakfast, Elba Loredo MD, 1 tablet at 06/21/25 0758    guaiFENesin (Mucinex) 12 hr tablet 600 mg, 600 mg, oral, q12h PRN, Elba Loredo MD    heparin (porcine) injection 5,000 Units, 5,000 Units, subcutaneous, q8h, Elba Loredo MD, 5,000 Units at 06/21/25 0759    HYDROcodone-acetaminophen (Norco) 5-325 mg per tablet 1 tablet, 1 tablet, oral, q6h PRN, Elba Loredo MD, 1 tablet at 06/20/25 2310    hydrOXYzine HCL (Atarax) tablet 25 mg, 25 mg, oral, q8h PRN, Elba Loredo MD, 25 mg at 06/19/25 1705    levothyroxine (Synthroid, Levoxyl) tablet 150 mcg, 150 mcg, oral, Daily, Elba Loredo MD, 150 mcg at 06/21/25 0611    lidocaine 4 % patch 1 patch, 1 patch, transdermal, Daily, Elba Loredo MD, 1 patch at 06/21/25 0759    melatonin tablet 6 mg, 6 mg, oral, Nightly PRN, Elba Loredo MD, 6 mg at 06/20/25 2310    ondansetron (Zofran) tablet 4 mg, 4 mg, oral, q8h PRN **OR** ondansetron (Zofran) injection 4 mg, 4 mg, intravenous, q8h PRN, Elba Loredo MD, 4 mg at 06/17/25 1330    oxyBUTYnin (Ditropan) tablet 2.5 mg, 2.5 mg, oral, BID, Elba Loredo MD, 2.5 mg at  06/21/25 0758    pantoprazole (ProtoNix) EC tablet 40 mg, 40 mg, oral, Daily before breakfast, Elba Loredo MD, 40 mg at 06/21/25 0610    polyethylene glycol (Glycolax, Miralax) packet 17 g, 17 g, oral, Daily PRN, Elba Loredo MD    polyethylene glycol (Glycolax, Miralax) packet 17 g, 17 g, oral, BID, MINDY Rodríguez, 17 g at 06/20/25 0922    traMADol (Ultram) tablet 50 mg, 50 mg, oral, q6h PRN, MINDY Rodríguez, 50 mg at 06/21/25 1243

## 2025-06-21 NOTE — PROGRESS NOTES
06/21/25 1000   Discharge Planning   Assistance Needed Needs assist with bathing, dressing, meals, is WC bound;   Home or Post Acute Services In home services   Type of Home Care Services Home nursing visits;Home health aide  ([facility] - need to confirm SOC)   Expected Discharge Disposition Home Health     Final ID recs:  Home on oral antibiotics.     TCT Leonela, pts daughter to discuss dc plan for home.  Pt has 5 hours of HHA daily to assist with bathing, dressing, meals, laundry and any other daily needs.    RN services will need to be added to [facility] order for wound care.    Secure chat sent to attending and bedside RN to contact RN TCC when DC order is in so that SOC can be confirmed.   Secure chat sent to attending that RN TCC added correct HHC agency to outgoing referral and it is pended for his review.     1230:  ADDENDUM  DC finalized.  SOC confirmed for 6/23-6/24.   AVS sent to [facility] via [employer].   RN notified. Pts dtr notified.   1245:  ADDENDUM  Roundtrip arranged for 1:45  time.  Evelia HILARIO notified.   Pts, dtr notified and will be home.

## 2025-06-21 NOTE — CARE PLAN
The patient's goals for the shift include Iv antibiotics    The clinical goals for the shift include pain management      Problem: Safety - Adult  Goal: Free from fall injury  Outcome: Progressing     Problem: Discharge Planning  Goal: Discharge to home or other facility with appropriate resources  Outcome: Progressing     Problem: Chronic Conditions and Co-morbidities  Goal: Patient's chronic conditions and co-morbidity symptoms are monitored and maintained or improved  Outcome: Progressing     Problem: Nutrition  Goal: Nutrient intake appropriate for maintaining nutritional needs  Outcome: Progressing     Problem: Skin  Goal: Decreased wound size/increased tissue granulation at next dressing change  Outcome: Progressing  Flowsheets (Taken 6/21/2025 0824)  Decreased wound size/increased tissue granulation at next dressing change: Promote sleep for wound healing  Goal: Participates in plan/prevention/treatment measures  Outcome: Progressing  Flowsheets (Taken 6/21/2025 0824)  Participates in plan/prevention/treatment measures: Discuss with provider PT/OT consult  Goal: Prevent/manage excess moisture  Outcome: Progressing  Flowsheets (Taken 6/21/2025 0824)  Prevent/manage excess moisture: Monitor for/manage infection if present  Goal: Prevent/minimize sheer/friction injuries  Outcome: Progressing  Flowsheets (Taken 6/21/2025 0824)  Prevent/minimize sheer/friction injuries: Use pull sheet  Goal: Promote/optimize nutrition  Outcome: Progressing  Flowsheets (Taken 6/21/2025 0824)  Promote/optimize nutrition: Monitor/record intake including meals  Goal: Promote skin healing  Outcome: Progressing  Flowsheets (Taken 6/21/2025 0824)  Promote skin healing: Turn/reposition every 2 hours/use positioning/transfer devices     Problem: Fall/Injury  Goal: Not fall by end of shift  Outcome: Progressing  Goal: Be free from injury by end of the shift  Outcome: Progressing  Goal: Verbalize understanding of personal risk factors for  fall in the hospital  Outcome: Progressing  Goal: Verbalize understanding of risk factor reduction measures to prevent injury from fall in the home  Outcome: Progressing  Goal: Use assistive devices by end of the shift  Outcome: Progressing  Goal: Pace activities to prevent fatigue by end of the shift  Outcome: Progressing     Problem: Pain  Goal: Takes deep breaths with improved pain control throughout the shift  Outcome: Progressing  Goal: Turns in bed with improved pain control throughout the shift  Outcome: Progressing  Goal: Walks with improved pain control throughout the shift  Outcome: Progressing  Goal: Performs ADL's with improved pain control throughout shift  Outcome: Progressing  Goal: Participates in PT with improved pain control throughout the shift  Outcome: Progressing  Goal: Free from opioid side effects throughout the shift  Outcome: Progressing  Goal: Free from acute confusion related to pain meds throughout the shift  Outcome: Progressing

## 2025-06-21 NOTE — ASSESSMENT & PLAN NOTE
Right lower extremity injury / trauma  Right lower extremity hematoma status post OR I&D, hematoma evacuation infection culture + Enterococcus  Normocytic anemia stable  Iron deficiency  Pyuria -   Leukocytosis resolved  Toxic encephalopathy  Dementia  Hypothyroidism  Overactive bladder  Left hip pain, chronic  Abdominal pain improved   Constipation - resolved  Atrial fibrillation / atrial flutte    Plan: Continue current medication.  Supportive care.  The patient has been cleared by the consultants to go home.  Patient is off and on A-fib with RVR.  Heart rate is fairly controlled at this time.  The patient is being discharged home in a stable condition with a home health care and home PT/OT per family request.

## 2025-06-22 LAB
ATRIAL RATE: 214 BPM
P AXIS: 71 DEGREES
P OFFSET: 174 MS
P ONSET: 109 MS
Q ONSET: 216 MS
QRS COUNT: 10 BEATS
QRS DURATION: 98 MS
QT INTERVAL: 432 MS
QTC CALCULATION(BAZETT): 432 MS
QTC FREDERICIA: 432 MS
R AXIS: 9 DEGREES
T AXIS: 9 DEGREES
T OFFSET: 432 MS
VENTRICULAR RATE: 60 BPM

## 2025-07-14 ENCOUNTER — APPOINTMENT (OUTPATIENT)
Dept: WOUND CARE | Facility: HOSPITAL | Age: 80
End: 2025-07-14
Payer: MEDICAID

## 2025-07-17 ENCOUNTER — OFFICE VISIT (OUTPATIENT)
Dept: WOUND CARE | Facility: CLINIC | Age: 80
End: 2025-07-17
Payer: MEDICAID

## 2025-07-17 PROCEDURE — 97597 DBRDMT OPN WND 1ST 20 CM/<: CPT

## 2025-07-17 PROCEDURE — 97597 DBRDMT OPN WND 1ST 20 CM/<: CPT | Performed by: SURGERY

## 2025-07-17 PROCEDURE — 99212 OFFICE O/P EST SF 10 MIN: CPT | Mod: 25

## 2025-07-17 PROCEDURE — 99204 OFFICE O/P NEW MOD 45 MIN: CPT | Performed by: SURGERY

## 2025-07-31 ENCOUNTER — OFFICE VISIT (OUTPATIENT)
Dept: WOUND CARE | Facility: CLINIC | Age: 80
End: 2025-07-31
Payer: MEDICAID

## 2025-07-31 PROCEDURE — 99214 OFFICE O/P EST MOD 30 MIN: CPT | Performed by: SURGERY

## 2025-07-31 PROCEDURE — 99213 OFFICE O/P EST LOW 20 MIN: CPT

## 2025-08-07 ENCOUNTER — APPOINTMENT (OUTPATIENT)
Dept: WOUND CARE | Facility: CLINIC | Age: 80
End: 2025-08-07
Payer: MEDICAID

## 2025-08-11 ENCOUNTER — TELEPHONE (OUTPATIENT)
Dept: OPHTHALMOLOGY | Age: 80
End: 2025-08-11
Payer: MEDICAID

## 2025-08-11 ENCOUNTER — DOCUMENTATION (OUTPATIENT)
Dept: OPHTHALMOLOGY | Age: 80
End: 2025-08-11
Payer: MEDICAID

## 2025-08-13 ENCOUNTER — TELEPHONE (OUTPATIENT)
Dept: OPHTHALMOLOGY | Age: 80
End: 2025-08-13

## 2025-08-13 ENCOUNTER — HOSPITAL ENCOUNTER (EMERGENCY)
Facility: HOSPITAL | Age: 80
Discharge: HOME | End: 2025-08-13
Attending: EMERGENCY MEDICINE
Payer: MEDICAID

## 2025-08-13 ENCOUNTER — OFFICE VISIT (OUTPATIENT)
Dept: PAIN MEDICINE | Facility: HOSPITAL | Age: 80
End: 2025-08-13
Payer: MEDICAID

## 2025-08-13 VITALS
TEMPERATURE: 98.1 F | HEIGHT: 67 IN | WEIGHT: 220 LBS | HEART RATE: 67 BPM | RESPIRATION RATE: 18 BRPM | OXYGEN SATURATION: 95 % | SYSTOLIC BLOOD PRESSURE: 96 MMHG | BODY MASS INDEX: 34.53 KG/M2 | DIASTOLIC BLOOD PRESSURE: 74 MMHG

## 2025-08-13 DIAGNOSIS — H57.10 PAIN IN EYE, UNSPECIFIED LATERALITY: ICD-10-CM

## 2025-08-13 DIAGNOSIS — S05.01XA ABRASION OF RIGHT CORNEA, INITIAL ENCOUNTER: ICD-10-CM

## 2025-08-13 DIAGNOSIS — M16.12 ARTHRITIS OF LEFT HIP: Primary | ICD-10-CM

## 2025-08-13 DIAGNOSIS — H57.13 PAIN OF BOTH EYES: Primary | ICD-10-CM

## 2025-08-13 DIAGNOSIS — H57.89 REDNESS OF BOTH EYES: ICD-10-CM

## 2025-08-13 DIAGNOSIS — Z79.01 CHRONIC ANTICOAGULATION: ICD-10-CM

## 2025-08-13 PROCEDURE — 99203 OFFICE O/P NEW LOW 30 MIN: CPT | Performed by: PAIN MEDICINE

## 2025-08-13 PROCEDURE — 1125F AMNT PAIN NOTED PAIN PRSNT: CPT | Performed by: PAIN MEDICINE

## 2025-08-13 PROCEDURE — 1159F MED LIST DOCD IN RCRD: CPT | Performed by: PAIN MEDICINE

## 2025-08-13 PROCEDURE — 2500000001 HC RX 250 WO HCPCS SELF ADMINISTERED DRUGS (ALT 637 FOR MEDICARE OP)

## 2025-08-13 PROCEDURE — 99284 EMERGENCY DEPT VISIT MOD MDM: CPT | Performed by: EMERGENCY MEDICINE

## 2025-08-13 PROCEDURE — 2500000005 HC RX 250 GENERAL PHARMACY W/O HCPCS

## 2025-08-13 RX ORDER — TETRACAINE HYDROCHLORIDE 5 MG/ML
1 SOLUTION OPHTHALMIC ONCE
Status: COMPLETED | OUTPATIENT
Start: 2025-08-13 | End: 2025-08-13

## 2025-08-13 RX ORDER — TOBRAMYCIN 3 MG/ML
1 SOLUTION/ DROPS OPHTHALMIC 4 TIMES DAILY
Qty: 5 ML | Refills: 0 | Status: SHIPPED | OUTPATIENT
Start: 2025-08-13 | End: 2025-08-27

## 2025-08-13 RX ORDER — ERYTHROMYCIN 5 MG/G
1 OINTMENT OPHTHALMIC 4 TIMES DAILY
Qty: 3.5 G | Refills: 0 | Status: SHIPPED | OUTPATIENT
Start: 2025-08-13 | End: 2025-08-27

## 2025-08-13 RX ORDER — FLUORESCEIN SODIUM 1 MG/MG
1 STRIP OPHTHALMIC ONCE
Status: COMPLETED | OUTPATIENT
Start: 2025-08-13 | End: 2025-08-13

## 2025-08-13 RX ORDER — TOBRAMYCIN 3 MG/ML
1 SOLUTION/ DROPS OPHTHALMIC ONCE
Status: COMPLETED | OUTPATIENT
Start: 2025-08-13 | End: 2025-08-13

## 2025-08-13 RX ORDER — ERYTHROMYCIN 5 MG/G
1 OINTMENT OPHTHALMIC ONCE
Status: COMPLETED | OUTPATIENT
Start: 2025-08-13 | End: 2025-08-13

## 2025-08-13 RX ADMIN — ERYTHROMYCIN 1 CM: 5 OINTMENT OPHTHALMIC at 17:17

## 2025-08-13 RX ADMIN — TETRACAINE HYDROCHLORIDE 1 DROP: 5 SOLUTION OPHTHALMIC at 17:10

## 2025-08-13 RX ADMIN — TOBRAMYCIN 1 DROP: 3 SOLUTION/ DROPS OPHTHALMIC at 17:23

## 2025-08-13 RX ADMIN — FLUORESCEIN SODIUM 1 STRIP: 1 STRIP OPHTHALMIC at 17:10

## 2025-08-13 ASSESSMENT — PAIN SCALES - GENERAL
PAINLEVEL_OUTOF10: 10-WORST PAIN EVER
PAINLEVEL_OUTOF10: 8

## 2025-08-13 ASSESSMENT — PAIN DESCRIPTION - DESCRIPTORS: DESCRIPTORS: BURNING

## 2025-08-13 ASSESSMENT — TONOMETRY
OD_IOP_MMHG: 14
OS_IOP_MMHG: 13

## 2025-08-13 ASSESSMENT — PAIN - FUNCTIONAL ASSESSMENT: PAIN_FUNCTIONAL_ASSESSMENT: 0-10

## 2025-08-13 ASSESSMENT — VISUAL ACUITY
OD: 20/100
OS: 20/70
OU: 20/70

## 2025-08-13 ASSESSMENT — PAIN DESCRIPTION - LOCATION: LOCATION: EAR

## 2025-08-18 ENCOUNTER — APPOINTMENT (OUTPATIENT)
Dept: OPHTHALMOLOGY | Facility: CLINIC | Age: 80
End: 2025-08-18
Payer: MEDICAID

## 2025-08-18 DIAGNOSIS — H16.223 KERATOCONJUNCTIVITIS SICCA OF BOTH EYES NOT SPECIFIED AS SJOGREN'S: Primary | ICD-10-CM

## 2025-08-18 DIAGNOSIS — H18.9 EXPOSURE KERATOPATHY: ICD-10-CM

## 2025-08-18 PROCEDURE — 99203 OFFICE O/P NEW LOW 30 MIN: CPT | Performed by: OPTOMETRIST

## 2025-08-18 ASSESSMENT — VISUAL ACUITY
METHOD: SNELLEN - LINEAR
OD_CC: 20/50+2
CORRECTION_TYPE: GLASSES
OS_CC: 20/70

## 2025-08-18 ASSESSMENT — EXTERNAL EXAM - RIGHT EYE: OD_EXAM: NORMAL

## 2025-08-18 ASSESSMENT — EXTERNAL EXAM - LEFT EYE: OS_EXAM: NORMAL

## 2025-09-04 ENCOUNTER — APPOINTMENT (OUTPATIENT)
Dept: WOUND CARE | Facility: CLINIC | Age: 80
End: 2025-09-04
Payer: MEDICAID

## 2025-09-11 ENCOUNTER — APPOINTMENT (OUTPATIENT)
Dept: OPHTHALMOLOGY | Facility: CLINIC | Age: 80
End: 2025-09-11
Payer: MEDICAID

## (undated) DEVICE — GLOVE, SURGICAL, PROTEXIS PI BLUE W/NEUTHERA, 7.5, PF, LF

## (undated) DEVICE — BANDAGE, GAUZE, 6 PLY, KERLIX, 2.25 IN X 3 YD, STERILE

## (undated) DEVICE — BANDAGE, COBAN 2, LAYER LITE COMPRESSION, 4 IN, LF

## (undated) DEVICE — GLOVE, SURGICAL, PROTEXIS PI , 7.0, PF, LF

## (undated) DEVICE — GOWN, SURGICAL, SIRUS, NON REINFORCED, LARGE

## (undated) DEVICE — SYRINGE, 10 CC, LUER LOCK

## (undated) DEVICE — SUTURE, VICRYL, 2-0, 18 IN, UNDYED

## (undated) DEVICE — Device

## (undated) DEVICE — SOLUTION, IRRIGATION, X RX SODIUM CHL 0.9%, 1000ML BTL

## (undated) DEVICE — NEEDLE, HYPODERMIC, PROEDGE, 22G X 1.5, BLACK